# Patient Record
Sex: FEMALE | Race: WHITE | Employment: OTHER | URBAN - METROPOLITAN AREA
[De-identification: names, ages, dates, MRNs, and addresses within clinical notes are randomized per-mention and may not be internally consistent; named-entity substitution may affect disease eponyms.]

---

## 2017-05-17 RX ORDER — HYDROCHLOROTHIAZIDE 12.5 MG/1
CAPSULE, GELATIN COATED ORAL
Qty: 90 CAPSULE | Refills: 0 | Status: SHIPPED | OUTPATIENT
Start: 2017-05-17 | End: 2017-08-05

## 2017-05-17 RX ORDER — PHENOBARBITAL 60 MG/1
TABLET ORAL
COMMUNITY
Start: 2007-12-10 | End: 2017-10-17 | Stop reason: DRUGHIGH

## 2017-05-17 RX ORDER — EPINEPHRINE 0.3 MG/.3ML
0.3 INJECTION SUBCUTANEOUS AS NEEDED
COMMUNITY
Start: 2014-09-13

## 2017-05-17 RX ORDER — HYDROCHLOROTHIAZIDE 12.5 MG/1
1 CAPSULE, GELATIN COATED ORAL DAILY
COMMUNITY
Start: 2012-06-07 | End: 2017-10-17

## 2017-08-07 RX ORDER — HYDROCHLOROTHIAZIDE 12.5 MG/1
CAPSULE, GELATIN COATED ORAL
Qty: 90 CAPSULE | Refills: 0 | Status: SHIPPED | OUTPATIENT
Start: 2017-08-07 | End: 2017-10-17

## 2017-10-17 ENCOUNTER — OFFICE VISIT (OUTPATIENT)
Dept: FAMILY MEDICINE CLINIC | Facility: CLINIC | Age: 62
End: 2017-10-17

## 2017-10-17 ENCOUNTER — LAB ENCOUNTER (OUTPATIENT)
Dept: LAB | Age: 62
End: 2017-10-17
Attending: FAMILY MEDICINE

## 2017-10-17 VITALS
SYSTOLIC BLOOD PRESSURE: 126 MMHG | DIASTOLIC BLOOD PRESSURE: 70 MMHG | HEART RATE: 60 BPM | HEIGHT: 65.5 IN | BODY MASS INDEX: 27.84 KG/M2 | WEIGHT: 169.13 LBS | TEMPERATURE: 98 F | RESPIRATION RATE: 16 BRPM

## 2017-10-17 DIAGNOSIS — R56.9 SEIZURE (HCC): ICD-10-CM

## 2017-10-17 DIAGNOSIS — G40.909 SEIZURE DISORDER (HCC): ICD-10-CM

## 2017-10-17 DIAGNOSIS — Z00.00 ANNUAL PHYSICAL EXAM: Primary | ICD-10-CM

## 2017-10-17 PROCEDURE — 85025 COMPLETE CBC W/AUTO DIFF WBC: CPT | Performed by: FAMILY MEDICINE

## 2017-10-17 PROCEDURE — 36415 COLL VENOUS BLD VENIPUNCTURE: CPT | Performed by: FAMILY MEDICINE

## 2017-10-17 PROCEDURE — 99396 PREV VISIT EST AGE 40-64: CPT | Performed by: FAMILY MEDICINE

## 2017-10-17 PROCEDURE — 80053 COMPREHEN METABOLIC PANEL: CPT | Performed by: FAMILY MEDICINE

## 2017-10-17 PROCEDURE — 80184 ASSAY OF PHENOBARBITAL: CPT | Performed by: FAMILY MEDICINE

## 2017-10-17 RX ORDER — HYDROCHLOROTHIAZIDE 12.5 MG/1
12.5 CAPSULE, GELATIN COATED ORAL DAILY
Qty: 90 CAPSULE | Refills: 3 | Status: SHIPPED | OUTPATIENT
Start: 2017-10-17 | End: 2018-11-09

## 2017-10-17 RX ORDER — PHENOBARBITAL 64.8 MG/1
2 TABLET ORAL 2 TIMES DAILY
COMMUNITY
Start: 2017-08-31 | End: 2020-06-30 | Stop reason: DRUGHIGH

## 2017-10-17 NOTE — PROGRESS NOTES
CC: Annual Physical Exam    HPI:   Silvia Kwon is a 58year old female who presents for a complete physical exam.  Patient generally doing well  . No seizurese since 1979. Pt seeing neurology soon    Pt generally feeling well, no complaints.       Wt R rest.  RESPIRATORY:  Denies shortness of breath, wheezing, cough or sputum. GASTROINTESTINAL:  Denies abdominal pain, nausea, vomiting, constipation, diarrhea, or blood in stool.   MUSCULOSKELETAL:  Denies weakness, muscle aches, back pain, joint pain, swe tenderness. ABDOMEN:  Soft, nondistended, nontender, bowel sounds normal in all 4 quadrants, no masses, no hepatosplenomegaly. BACK: No tenderness, no spasm, SLR test negative, FROM.   : Deferred   EXTREMITIES:  No edema, no cyanosis, no clubbing, FROM,

## 2017-10-18 ENCOUNTER — TELEPHONE (OUTPATIENT)
Dept: FAMILY MEDICINE CLINIC | Facility: CLINIC | Age: 62
End: 2017-10-18

## 2017-10-18 NOTE — TELEPHONE ENCOUNTER
----- Message from Hernan Olsen MD sent at 10/18/2017  8:21 AM CDT -----  Reviewed  Phenobarbital level normal-in range.   Chem panel, cbc - stable  Only elevated alk phos which is unchanged  Please copy labs to neurology

## 2017-11-13 ENCOUNTER — TELEPHONE (OUTPATIENT)
Dept: FAMILY MEDICINE CLINIC | Facility: CLINIC | Age: 62
End: 2017-11-13

## 2017-11-13 NOTE — TELEPHONE ENCOUNTER
Patient calling wanting to know when next colonoscopy was due, notified  February 2018 expressed understanding and thanks

## 2018-09-18 ENCOUNTER — OFFICE VISIT (OUTPATIENT)
Dept: FAMILY MEDICINE CLINIC | Facility: CLINIC | Age: 63
End: 2018-09-18
Payer: COMMERCIAL

## 2018-09-18 VITALS
OXYGEN SATURATION: 98 % | RESPIRATION RATE: 16 BRPM | DIASTOLIC BLOOD PRESSURE: 64 MMHG | SYSTOLIC BLOOD PRESSURE: 122 MMHG | WEIGHT: 152.19 LBS | BODY MASS INDEX: 24.46 KG/M2 | TEMPERATURE: 98 F | HEIGHT: 66.25 IN | HEART RATE: 68 BPM

## 2018-09-18 DIAGNOSIS — L24.89 IRRITANT CONTACT DERMATITIS DUE TO OTHER AGENTS: Primary | ICD-10-CM

## 2018-09-18 PROCEDURE — 99214 OFFICE O/P EST MOD 30 MIN: CPT | Performed by: FAMILY MEDICINE

## 2018-09-18 RX ORDER — PREDNISONE 20 MG/1
20 TABLET ORAL 2 TIMES DAILY
Qty: 10 TABLET | Refills: 0 | Status: SHIPPED | OUTPATIENT
Start: 2018-09-18 | End: 2018-09-23

## 2018-09-18 NOTE — PROGRESS NOTES
[address]  PROGRESS NOTE  Chief Complaint:   Patient presents with:  Rash: Started on neck and is spreading multiple different places.  Patient stated it is very itchy  Other: Patient had teeth cleaned and was told she had a few sores in Absolute 0.43 0.10 - 0.60 x10(3) uL    Eosinophil Absolute 0.22 0.00 - 0.30 x10(3) uL    Basophil Absolute 0.03 0.00 - 0.10 x10(3) uL    Immature Granulocyte Absolute 0.02 0.00 - 1.00 x10(3) uL    Neutrophil % 61.2 %    Lymphocyte % 26.8 %    Monocyte % 7. 0.3 MG/0.3ML Injection Solution Auto-injector  Disp:  Rfl:       Counseling given: Not Answered       REVIEW OF SYSTEMS:   CONSTITUTIONAL:  Denies unusual weight gain/loss, fever, chills, or fatigue.   EENT:  Eyes:  Denies eye pain, visual loss, blurred vis on both thighs and left thumb region. Is a small area of erythema on her right forearm. HEART:  Regular rate and rhythm, no murmurs, rubs or gallops. LUNGS: Clear to auscultation bilterally, no rales/rhonchi/wheezing.   ABDOMEN:  Soft, nondistended, nont

## 2018-09-18 NOTE — PATIENT INSTRUCTIONS
Contact dermatiitis    rec prednisone-- 5 days burst    Start zyrtec or claritin- daily for a month    Hydrate well.     Monitor and recheck if further concerns

## 2018-10-16 ENCOUNTER — OFFICE VISIT (OUTPATIENT)
Dept: FAMILY MEDICINE CLINIC | Facility: CLINIC | Age: 63
End: 2018-10-16
Payer: COMMERCIAL

## 2018-10-16 VITALS
HEART RATE: 78 BPM | TEMPERATURE: 97 F | OXYGEN SATURATION: 98 % | SYSTOLIC BLOOD PRESSURE: 144 MMHG | BODY MASS INDEX: 25.23 KG/M2 | RESPIRATION RATE: 16 BRPM | DIASTOLIC BLOOD PRESSURE: 82 MMHG | WEIGHT: 157 LBS | HEIGHT: 66 IN

## 2018-10-16 DIAGNOSIS — Z00.00 ANNUAL PHYSICAL EXAM: Primary | ICD-10-CM

## 2018-10-16 DIAGNOSIS — G40.909 SEIZURE DISORDER (HCC): ICD-10-CM

## 2018-10-16 DIAGNOSIS — I10 BENIGN ESSENTIAL HYPERTENSION: ICD-10-CM

## 2018-10-16 DIAGNOSIS — M15.9 PRIMARY OSTEOARTHRITIS INVOLVING MULTIPLE JOINTS: ICD-10-CM

## 2018-10-16 PROCEDURE — 88175 CYTOPATH C/V AUTO FLUID REDO: CPT | Performed by: FAMILY MEDICINE

## 2018-10-16 PROCEDURE — 99396 PREV VISIT EST AGE 40-64: CPT | Performed by: FAMILY MEDICINE

## 2018-10-16 PROCEDURE — 87624 HPV HI-RISK TYP POOLED RSLT: CPT | Performed by: FAMILY MEDICINE

## 2018-10-16 NOTE — PROGRESS NOTES
CC: Annual Physical Exam    HPI:   Jose Juan Arguelles is a 61year old female who presents for a complete physical exam.    Due for pap   nild congestion. No headache no dizziness. Patient thinks that she has been taking Claritin-D or possibly Claritin.   Her b x10(3) uL    Immature Granulocyte Absolute 0.02 0.00 - 1.00 x10(3) uL    Neutrophil % 61.2 %    Lymphocyte % 26.8 %    Monocyte % 7.4 %    Eosinophil % 3.8 %    Basophil % 0.5 %    Immature Granulocyte % 0.3 %         Current Outpatient Medications:  aspir Eyes:  Denies eye pain, visual loss, blurred vision, double vision or yellow sclerae. Ears, Nose, Throat:  Denies hearing loss, sneezing, congestion, runny nose or sore throat.   INTEGUMENTARY:  Denies rashes, itching, skin lesion, or excessive skin dryness thyromegaly. SKIN: No rashes, no skin lesion, no bruising, good turgor. HEART:  Regular rate and rhythm, no murmurs, rubs or gallops. LUNGS: Clear to auscultation bilaterally, no rales/rhonchi/wheezing.   BREAST: No tenderness, no masses, no lesion, no n Colorectal Screening due on 02/01/2005  Colonoscopy due on 02/04/2018  Pap Smear,3 Years due on 08/28/2018  Influenza Vaccine(1) due on 09/01/2018      Pt' s weight is Body mass index is 25.34 kg/m². , recommended low fat diet and aerobic exercise 30 minute

## 2018-10-16 NOTE — PATIENT INSTRUCTIONS
Pt to see Sobeida Cross for colonoscopy- Due    rec fasting labs    rec avoid Claritin D-- ok for Claritin    Pt decline flu vaccine    Encourage core strengthening and Kegel exercisise    Pap today

## 2018-10-17 ENCOUNTER — TELEPHONE (OUTPATIENT)
Dept: FAMILY MEDICINE CLINIC | Facility: CLINIC | Age: 63
End: 2018-10-17

## 2018-10-17 NOTE — TELEPHONE ENCOUNTER
----- Message from Sandee Barajas MD sent at 10/17/2018  4:00 PM CDT -----  Negative Pap smear. Negative HPV screen. Tests are reassuring   patient to have follow-up Pap if concerns.

## 2018-11-09 RX ORDER — HYDROCHLOROTHIAZIDE 12.5 MG/1
CAPSULE, GELATIN COATED ORAL
Qty: 90 CAPSULE | Refills: 3 | Status: SHIPPED | OUTPATIENT
Start: 2018-11-09 | End: 2019-11-16

## 2018-11-09 NOTE — TELEPHONE ENCOUNTER
.Future appt:     Your appointments     Date & Time Appointment Department [facility])    Nov 10, 2018  8:45 AM CST Laboratory Visit with REF [facility] ([facility])        [facility]  [facility] [place]  [place]

## 2018-11-10 ENCOUNTER — LABORATORY ENCOUNTER (OUTPATIENT)
Dept: LAB | Age: 63
End: 2018-11-10
Attending: FAMILY MEDICINE
Payer: COMMERCIAL

## 2018-11-10 DIAGNOSIS — G40.909 SEIZURE DISORDER (HCC): ICD-10-CM

## 2018-11-10 DIAGNOSIS — I10 BENIGN ESSENTIAL HYPERTENSION: ICD-10-CM

## 2018-11-10 DIAGNOSIS — Z00.00 ANNUAL PHYSICAL EXAM: ICD-10-CM

## 2018-11-10 PROCEDURE — 80061 LIPID PANEL: CPT | Performed by: FAMILY MEDICINE

## 2018-11-10 PROCEDURE — 80050 GENERAL HEALTH PANEL: CPT | Performed by: FAMILY MEDICINE

## 2018-11-10 PROCEDURE — 80184 ASSAY OF PHENOBARBITAL: CPT | Performed by: FAMILY MEDICINE

## 2018-11-10 PROCEDURE — 81003 URINALYSIS AUTO W/O SCOPE: CPT | Performed by: FAMILY MEDICINE

## 2018-11-10 PROCEDURE — 36415 COLL VENOUS BLD VENIPUNCTURE: CPT | Performed by: FAMILY MEDICINE

## 2018-11-12 ENCOUNTER — TELEPHONE (OUTPATIENT)
Dept: FAMILY MEDICINE CLINIC | Facility: CLINIC | Age: 63
End: 2018-11-12

## 2018-11-12 DIAGNOSIS — E87.1 HYPONATREMIA: Primary | ICD-10-CM

## 2018-11-12 NOTE — TELEPHONE ENCOUNTER
----- Message from Kwame Bunch MD sent at 11/12/2018  7:43 AM CST -----  Lab results reviewed  Lipids, thyroid, cbc-normal  Phenobarb level in range  ua-normal  Chemistry- Sodium Chloride slightly low-- this can be caused by HCTZ- I alexandre christine

## 2018-12-15 ENCOUNTER — APPOINTMENT (OUTPATIENT)
Dept: LAB | Age: 63
End: 2018-12-15
Attending: FAMILY MEDICINE
Payer: COMMERCIAL

## 2018-12-15 DIAGNOSIS — E87.1 HYPONATREMIA: ICD-10-CM

## 2018-12-15 PROCEDURE — 80048 BASIC METABOLIC PNL TOTAL CA: CPT | Performed by: FAMILY MEDICINE

## 2018-12-15 PROCEDURE — 36415 COLL VENOUS BLD VENIPUNCTURE: CPT | Performed by: FAMILY MEDICINE

## 2018-12-17 ENCOUNTER — TELEPHONE (OUTPATIENT)
Dept: FAMILY MEDICINE CLINIC | Facility: CLINIC | Age: 63
End: 2018-12-17

## 2019-07-08 ENCOUNTER — TELEPHONE (OUTPATIENT)
Dept: FAMILY MEDICINE CLINIC | Facility: CLINIC | Age: 64
End: 2019-07-08

## 2019-07-08 NOTE — TELEPHONE ENCOUNTER
FYI:  Pt last px on file done 10/16/18. Pt asked if she was due for her Medicare [facility]- pt informed that we cannot schedule for Medicare px until she actually has Medicare coverage.     Pt currently is working on [employer], will be going on [place]

## 2019-07-08 NOTE — TELEPHONE ENCOUNTER
No current issues per pt  appt scheduled    Future Appointments   Date Time Provider Godfrey Davis   8/19/2019  8:00 AM Jim Juarez MD [facility]

## 2019-08-19 ENCOUNTER — OFFICE VISIT (OUTPATIENT)
Dept: FAMILY MEDICINE CLINIC | Facility: CLINIC | Age: 64
End: 2019-08-19
Payer: COMMERCIAL

## 2019-08-19 ENCOUNTER — LAB ENCOUNTER (OUTPATIENT)
Dept: LAB | Age: 64
End: 2019-08-19
Attending: FAMILY MEDICINE
Payer: COMMERCIAL

## 2019-08-19 VITALS
HEIGHT: 65.5 IN | HEART RATE: 82 BPM | TEMPERATURE: 98 F | SYSTOLIC BLOOD PRESSURE: 128 MMHG | WEIGHT: 159.19 LBS | OXYGEN SATURATION: 98 % | DIASTOLIC BLOOD PRESSURE: 68 MMHG | BODY MASS INDEX: 26.2 KG/M2 | RESPIRATION RATE: 14 BRPM

## 2019-08-19 DIAGNOSIS — I10 BENIGN ESSENTIAL HYPERTENSION: ICD-10-CM

## 2019-08-19 DIAGNOSIS — Z00.00 ANNUAL PHYSICAL EXAM: Primary | ICD-10-CM

## 2019-08-19 DIAGNOSIS — G40.909 SEIZURE DISORDER (HCC): ICD-10-CM

## 2019-08-19 DIAGNOSIS — M15.9 PRIMARY OSTEOARTHRITIS INVOLVING MULTIPLE JOINTS: ICD-10-CM

## 2019-08-19 LAB
ALBUMIN SERPL-MCNC: 3.9 G/DL (ref 3.4–5)
ALBUMIN/GLOB SERPL: 1.1 {RATIO} (ref 1–2)
ALP LIVER SERPL-CCNC: 139 U/L (ref 50–130)
ALT SERPL-CCNC: 47 U/L (ref 13–56)
ANION GAP SERPL CALC-SCNC: 7 MMOL/L (ref 0–18)
AST SERPL-CCNC: 22 U/L (ref 15–37)
BASOPHILS # BLD AUTO: 0.03 X10(3) UL (ref 0–0.2)
BASOPHILS NFR BLD AUTO: 0.5 %
BILIRUB SERPL-MCNC: 0.4 MG/DL (ref 0.1–2)
BILIRUB UR QL STRIP.AUTO: NEGATIVE
BUN BLD-MCNC: 11 MG/DL (ref 7–18)
BUN/CREAT SERPL: 16.9 (ref 10–20)
CALCIUM BLD-MCNC: 8.9 MG/DL (ref 8.5–10.1)
CHLORIDE SERPL-SCNC: 101 MMOL/L (ref 98–112)
CHOLEST SMN-MCNC: 181 MG/DL (ref ?–200)
CLARITY UR REFRACT.AUTO: CLEAR
CO2 SERPL-SCNC: 30 MMOL/L (ref 21–32)
COLOR UR AUTO: YELLOW
CREAT BLD-MCNC: 0.65 MG/DL (ref 0.55–1.02)
DEPRECATED RDW RBC AUTO: 43.9 FL (ref 35.1–46.3)
EOSINOPHIL # BLD AUTO: 0.28 X10(3) UL (ref 0–0.7)
EOSINOPHIL NFR BLD AUTO: 4.3 %
ERYTHROCYTE [DISTWIDTH] IN BLOOD BY AUTOMATED COUNT: 13.2 % (ref 11–15)
GLOBULIN PLAS-MCNC: 3.6 G/DL (ref 2.8–4.4)
GLUCOSE BLD-MCNC: 94 MG/DL (ref 70–99)
GLUCOSE UR STRIP.AUTO-MCNC: NEGATIVE MG/DL
HCT VFR BLD AUTO: 43.4 % (ref 35–48)
HDLC SERPL-MCNC: 71 MG/DL (ref 40–59)
HGB BLD-MCNC: 14.7 G/DL (ref 12–16)
IMM GRANULOCYTES # BLD AUTO: 0.02 X10(3) UL (ref 0–1)
IMM GRANULOCYTES NFR BLD: 0.3 %
KETONES UR STRIP.AUTO-MCNC: NEGATIVE MG/DL
LDLC SERPL CALC-MCNC: 83 MG/DL (ref ?–100)
LEUKOCYTE ESTERASE UR QL STRIP.AUTO: NEGATIVE
LYMPHOCYTES # BLD AUTO: 1.28 X10(3) UL (ref 1–4)
LYMPHOCYTES NFR BLD AUTO: 19.6 %
M PROTEIN MFR SERPL ELPH: 7.5 G/DL (ref 6.4–8.2)
MCH RBC QN AUTO: 30.4 PG (ref 26–34)
MCHC RBC AUTO-ENTMCNC: 33.9 G/DL (ref 31–37)
MCV RBC AUTO: 89.7 FL (ref 80–100)
MONOCYTES # BLD AUTO: 0.47 X10(3) UL (ref 0.1–1)
MONOCYTES NFR BLD AUTO: 7.2 %
NEUTROPHILS # BLD AUTO: 4.45 X10 (3) UL (ref 1.5–7.7)
NEUTROPHILS # BLD AUTO: 4.45 X10(3) UL (ref 1.5–7.7)
NEUTROPHILS NFR BLD AUTO: 68.1 %
NITRITE UR QL STRIP.AUTO: NEGATIVE
NONHDLC SERPL-MCNC: 110 MG/DL (ref ?–130)
OSMOLALITY SERPL CALC.SUM OF ELEC: 285 MOSM/KG (ref 275–295)
PH UR STRIP.AUTO: 8 [PH] (ref 4.5–8)
PHENOBARB SERPL-MCNC: 28.3 UG/ML (ref 15–40)
PLATELET # BLD AUTO: 239 10(3)UL (ref 150–450)
POTASSIUM SERPL-SCNC: 3.8 MMOL/L (ref 3.5–5.1)
PROT UR STRIP.AUTO-MCNC: NEGATIVE MG/DL
RBC # BLD AUTO: 4.84 X10(6)UL (ref 3.8–5.3)
RBC UR QL AUTO: NEGATIVE
SODIUM SERPL-SCNC: 138 MMOL/L (ref 136–145)
SP GR UR STRIP.AUTO: 1.01 (ref 1–1.03)
TRIGL SERPL-MCNC: 134 MG/DL (ref 30–149)
TSI SER-ACNC: 2.13 MIU/ML (ref 0.36–3.74)
UROBILINOGEN UR STRIP.AUTO-MCNC: <2 MG/DL
VLDLC SERPL CALC-MCNC: 27 MG/DL (ref 0–30)
WBC # BLD AUTO: 6.5 X10(3) UL (ref 4–11)

## 2019-08-19 PROCEDURE — 99396 PREV VISIT EST AGE 40-64: CPT | Performed by: FAMILY MEDICINE

## 2019-08-19 PROCEDURE — 80061 LIPID PANEL: CPT | Performed by: FAMILY MEDICINE

## 2019-08-19 PROCEDURE — 36415 COLL VENOUS BLD VENIPUNCTURE: CPT | Performed by: FAMILY MEDICINE

## 2019-08-19 PROCEDURE — 80184 ASSAY OF PHENOBARBITAL: CPT | Performed by: FAMILY MEDICINE

## 2019-08-19 PROCEDURE — 80050 GENERAL HEALTH PANEL: CPT | Performed by: FAMILY MEDICINE

## 2019-08-19 PROCEDURE — 81003 URINALYSIS AUTO W/O SCOPE: CPT | Performed by: FAMILY MEDICINE

## 2019-08-19 NOTE — PATIENT INSTRUCTIONS
Generally doing well    Labs today    Continue same medications    Continue active lifestyle, encourage exercise 3-5 x a week    F.u 2020-\" welcome to [facility]\" in the spring

## 2019-08-19 NOTE — PROGRESS NOTES
[facility] [place]  PROGRESS NOTE  Chief Complaint:   Patient presents with:  Physical      HPI:   This is a 59year old female coming in for general medical f.u    Some increase stress- notes less exercose amd stress caring for [facility]  [facility] Outpatient Medications:  HYDROCHLOROTHIAZIDE 12.5 MG Oral Cap TAKE 1 CAPSULE DAILY Disp: 90 capsule Rfl: 3   aspirin 81 MG Oral Tab Take 81 mg by mouth daily. Disp:  Rfl:    PHENobarbital 64.8 MG Oral Tab Take 2 tablets by mouth 2 (two) times daily.  Disp: 65. 5\". Weight as of this encounter: 159 lb 3.2 oz. Vital signs reviewed. Appears stated age, well groomed. Physical Exam:  GEN:  Patient is alert, awake and oriented, well developed, well nourished, no apparent distress.   HEENT:  Head:  Normocephalic neoplasm of gastrointestinal tract     Benign essential hypertension     Osteoarthrosis     Seizure disorder [facility])      Patient Instructions   Generally doing well    Labs today    Continue same medications    Continue active lifestyle, encourage exercise

## 2019-08-20 ENCOUNTER — TELEPHONE (OUTPATIENT)
Dept: FAMILY MEDICINE CLINIC | Facility: CLINIC | Age: 64
End: 2019-08-20

## 2019-08-20 NOTE — TELEPHONE ENCOUNTER
----- Message from Arlen Sahu MD sent at 8/20/2019  7:58 AM CDT -----  Laboratory results reviewed. Urinalysis normal.Thyroid function normal  Phenobarbital level is in desired range. Lipid profile normal and reassuring.   Chemistry panel and CBC

## 2019-09-16 ENCOUNTER — TELEPHONE (OUTPATIENT)
Dept: FAMILY MEDICINE CLINIC | Facility: CLINIC | Age: 64
End: 2019-09-16

## 2019-09-16 NOTE — TELEPHONE ENCOUNTER
Pt will be helping to watch her very your grandchildren. Pt informed that her last Tdap on file was given 10/8/17. Pt also informed she should get her flu injection.   Pt asked if there is any other vaccine she should have to protect her your grandchildre

## 2019-09-16 NOTE — TELEPHONE ENCOUNTER
Patient states she will be taking care of her 3year old and 1 month old grandkids, she would like to know if she is up to date on her vaccines and if she had the whooping cough vaccine.  Please call back

## 2019-11-16 RX ORDER — HYDROCHLOROTHIAZIDE 12.5 MG/1
CAPSULE, GELATIN COATED ORAL
Qty: 90 CAPSULE | Refills: 4 | Status: SHIPPED | OUTPATIENT
Start: 2019-11-16 | End: 2020-01-28

## 2019-11-16 NOTE — TELEPHONE ENCOUNTER
Future appt:     Your appointments     Date & Time Appointment Department [facility])    Mar 20, 2020  8:00 AM CDT Medicare Annual Well Visit with Luke Orellana MD [facility], [facility]            Ed

## 2020-01-28 ENCOUNTER — TELEPHONE (OUTPATIENT)
Dept: FAMILY MEDICINE CLINIC | Facility: CLINIC | Age: 65
End: 2020-01-28

## 2020-01-28 RX ORDER — HYDROCHLOROTHIAZIDE 12.5 MG/1
12.5 CAPSULE, GELATIN COATED ORAL
Qty: 90 CAPSULE | Refills: 1 | Status: SHIPPED | OUTPATIENT
Start: 2020-01-28 | End: 2020-08-19

## 2020-01-28 NOTE — TELEPHONE ENCOUNTER
Pt states that she is now on Medicare and will be using a new mail order for her scripts. She states that the new mail order company is [employer], ph# [phone], fax# [phone]. She is requesting a refill of HCTZ 12.5mg to be sent there.

## 2020-03-16 ENCOUNTER — TELEPHONE (OUTPATIENT)
Dept: FAMILY MEDICINE CLINIC | Facility: CLINIC | Age: 65
End: 2020-03-16

## 2020-03-16 DIAGNOSIS — I10 BENIGN ESSENTIAL HYPERTENSION: Primary | ICD-10-CM

## 2020-03-16 DIAGNOSIS — Z00.00 ANNUAL PHYSICAL EXAM: ICD-10-CM

## 2020-03-16 DIAGNOSIS — Z11.59 ENCOUNTER FOR HEPATITIS C SCREENING TEST FOR LOW RISK PATIENT: ICD-10-CM

## 2020-03-16 DIAGNOSIS — G40.909 SEIZURE DISORDER (HCC): ICD-10-CM

## 2020-03-16 NOTE — TELEPHONE ENCOUNTER
Patient has px appointment this Friday, wants to know if she should r/s due to COVID-19. Would like a call back.

## 2020-03-16 NOTE — TELEPHONE ENCOUNTER
Pt states she has an appt on Friday, wanted to reschedule her welcome to medicare appt due to community spread illness. Pt states she was doing fine at this time.       Future Appointments   Date Time Provider Godfrey Davis   5/7/2020  8:45 AM [facility]

## 2020-06-25 ENCOUNTER — LABORATORY ENCOUNTER (OUTPATIENT)
Dept: LAB | Age: 65
End: 2020-06-25
Attending: FAMILY MEDICINE
Payer: MEDICARE

## 2020-06-25 DIAGNOSIS — G40.909 SEIZURE DISORDER (HCC): ICD-10-CM

## 2020-06-25 DIAGNOSIS — I10 BENIGN ESSENTIAL HYPERTENSION: ICD-10-CM

## 2020-06-25 DIAGNOSIS — Z00.00 ANNUAL PHYSICAL EXAM: ICD-10-CM

## 2020-06-25 DIAGNOSIS — Z11.59 ENCOUNTER FOR HEPATITIS C SCREENING TEST FOR LOW RISK PATIENT: ICD-10-CM

## 2020-06-25 PROCEDURE — 80061 LIPID PANEL: CPT

## 2020-06-25 PROCEDURE — 80053 COMPREHEN METABOLIC PANEL: CPT

## 2020-06-25 PROCEDURE — 84443 ASSAY THYROID STIM HORMONE: CPT

## 2020-06-25 PROCEDURE — 80184 ASSAY OF PHENOBARBITAL: CPT

## 2020-06-25 PROCEDURE — 85025 COMPLETE CBC W/AUTO DIFF WBC: CPT

## 2020-06-25 PROCEDURE — 86803 HEPATITIS C AB TEST: CPT

## 2020-06-25 PROCEDURE — 81003 URINALYSIS AUTO W/O SCOPE: CPT

## 2020-06-25 PROCEDURE — 36415 COLL VENOUS BLD VENIPUNCTURE: CPT

## 2020-06-30 ENCOUNTER — OFFICE VISIT (OUTPATIENT)
Dept: FAMILY MEDICINE CLINIC | Facility: CLINIC | Age: 65
End: 2020-06-30
Payer: MEDICARE

## 2020-06-30 VITALS
BODY MASS INDEX: 27.02 KG/M2 | SYSTOLIC BLOOD PRESSURE: 138 MMHG | HEIGHT: 65 IN | WEIGHT: 162.19 LBS | RESPIRATION RATE: 16 BRPM | DIASTOLIC BLOOD PRESSURE: 84 MMHG | HEART RATE: 80 BPM | TEMPERATURE: 97 F

## 2020-06-30 DIAGNOSIS — I10 BENIGN ESSENTIAL HYPERTENSION: ICD-10-CM

## 2020-06-30 DIAGNOSIS — G40.909 SEIZURE DISORDER (HCC): ICD-10-CM

## 2020-06-30 DIAGNOSIS — Z23 NEED FOR VACCINATION: ICD-10-CM

## 2020-06-30 DIAGNOSIS — Z00.00 ENCOUNTER FOR ANNUAL HEALTH EXAMINATION: Primary | ICD-10-CM

## 2020-06-30 DIAGNOSIS — M15.9 PRIMARY OSTEOARTHRITIS INVOLVING MULTIPLE JOINTS: ICD-10-CM

## 2020-06-30 DIAGNOSIS — Z23 NEED FOR PNEUMOCOCCAL VACCINATION: ICD-10-CM

## 2020-06-30 PROCEDURE — G0403 EKG FOR INITIAL PREVENT EXAM: HCPCS | Performed by: FAMILY MEDICINE

## 2020-06-30 PROCEDURE — G0009 ADMIN PNEUMOCOCCAL VACCINE: HCPCS | Performed by: FAMILY MEDICINE

## 2020-06-30 PROCEDURE — G0402 INITIAL PREVENTIVE EXAM: HCPCS | Performed by: FAMILY MEDICINE

## 2020-06-30 PROCEDURE — 90670 PCV13 VACCINE IM: CPT | Performed by: FAMILY MEDICINE

## 2020-06-30 RX ORDER — PHENOBARBITAL 60 MG/1
120 TABLET ORAL 2 TIMES DAILY
COMMUNITY
Start: 2020-05-19

## 2020-06-30 NOTE — PATIENT INSTRUCTIONS
rec EKG today    Recommend mammogram    rec prevnar- pneumonia vaccine    Continue present medications    Encourage healthy diet and exercise    Recheck yearly and as needed          Loretta Wolff Venemarian's SCREENING SCHEDULE   Tests on this list are recommended b cigarettes in their lifetime   • Anyone with a family history    Colorectal Cancer Screening  Covered up to Age 76     Colonoscopy Screen   Covered every 10 years- more often if abnormal Colonoscopy due on 12/03/2028 [facility] if applicable performed in visit on 06/30/20   • PNEUMOCOCCAL VACC, 13 FLOYD IM    Please get once after your 65th birthday    Pneumococcal 23 (Pneumovax)  Covered Once after 65 No orders found for this or any previous visit.  Please get once after your 65th birthday    He

## 2020-06-30 NOTE — PROGRESS NOTES
CC: Annual Physical Exam    HPI:   Hussein Jimenez is a 72year old female who presents for a complete physical exam.  [place] to medicare checkup    Laboratory results reviewed.  Urinalysis negative.  Hepatitis C screen negative.  Chemistry panel shows sodi mg/dL    HDL Cholesterol 71 (H) 40 - 59 mg/dL    Triglycerides 126 30 - 149 mg/dL    LDL Cholesterol 103 (H) <100 mg/dL    VLDL 25 0 - 30 mg/dL    Non HDL Chol 128 <130 mg/dL    FASTING Yes    TSH W REFLEX TO FREE T4   Result Value Ref Range    TSH 1.910 0 capsule (12.5 mg total) by mouth once daily. 90 capsule 1   • aspirin 81 MG Oral Tab Take 81 mg by mouth daily. • EPINEPHrine (EPIPEN 2-FERN) 0.3 MG/0.3ML Injection Solution Auto-injector 0.3 mL as needed.             Bee                         Comment: help    Managing money/bills: Able without help    Taking medications as prescribed: Able without help    Are you able to afford your medications?: Yes    Hearing Problems?: No     Functional Status     Hearing Problems?: No    Vision Problems? : No    Dif sputum. GASTROINTESTINAL:  Denies abdominal pain, nausea, vomiting, constipation, diarrhea, or blood in stool. MUSCULOSKELETAL:  Denies weakness, muscle aches, back pain, joint pain, swelling or stiffness.   NEUROLOGICAL:  Denies headache, seizures, dizzi nondistended, nontender,no masses, no hepatosplenomegaly  BACK: No tenderness, no spasm, SLR test negative, FROM. : Deferred   EXTREMITIES:  No edema, no cyanosis, no clubbing, FROM, 2+ dorsalis pedis pulses bilaterally.   NEURO:  No deficit, normal gait not be covered, or covered at this frequency, by your insurer. Please check with your insurance carrier before scheduling to verify coverage.    PREVENTATIVE SERVICES  INDICATIONS AND SCHEDULE Internal Lab or Procedure External Lab or Procedure   Diabetes S Screen  Covered every 5 years No results found for this or any previous visit. No flowsheet data found. Fecal Occult Blood   Covered Annually No results found for: FOB, OCCULTSTOOL No flowsheet data found.      Barium Enema-   uncomfortable but covered Risk       No orders found for this or any previous visit.  Medium/high risk factors:   End-stage renal disease   Hemophiliacs who received Factor VIII or IX concentrates   Clients of institutions for the mentally retarded   Persons who live in the same [place] and as needed. This note was created utilizing Dragon speech recognition software.  Please excuse any grammatical errors. Call my office if you have any questions regarding this note.

## 2020-07-03 ENCOUNTER — HOSPITAL ENCOUNTER (OUTPATIENT)
Dept: MAMMOGRAPHY | Age: 65
Discharge: HOME | End: 2020-07-03
Attending: FAMILY MEDICINE
Payer: MEDICARE

## 2020-07-03 DIAGNOSIS — Z12.31 BREAST CANCER SCREENING BY MAMMOGRAM: ICD-10-CM

## 2020-07-03 PROCEDURE — 77063 BREAST TOMOSYNTHESIS BI: CPT | Performed by: FAMILY MEDICINE

## 2020-07-03 PROCEDURE — 77067 SCR MAMMO BI INCL CAD: CPT | Performed by: FAMILY MEDICINE

## 2020-07-06 ENCOUNTER — TELEPHONE (OUTPATIENT)
Dept: FAMILY MEDICINE CLINIC | Facility: CLINIC | Age: 65
End: 2020-07-06

## 2020-07-06 NOTE — TELEPHONE ENCOUNTER
Spoke with Ben Fontana at [address] Ben Fontana states they have requested additional imaging from [facility] for comparison. Ben Fontana states they have also left a message for pt and are waiting to hear back.     Pt states she was contacted and was told

## 2020-07-11 NOTE — TELEPHONE ENCOUNTER
Spoke with pt. Pt states she hasn't heard anything yet. Pt states she will call Elieser Monge on Monday re: need to follow up.

## 2020-07-13 ENCOUNTER — TELEPHONE (OUTPATIENT)
Dept: FAMILY MEDICINE CLINIC | Facility: CLINIC | Age: 65
End: 2020-07-13

## 2020-07-13 DIAGNOSIS — R92.8 ABNORMAL MAMMOGRAM OF BOTH BREASTS: Primary | ICD-10-CM

## 2020-07-13 NOTE — TELEPHONE ENCOUNTER
Left detailed message for pt. Follow up mammogram order with reported dated 7/3/20 faxed to [facility].

## 2020-07-13 NOTE — TELEPHONE ENCOUNTER
Request noted. Follow up mammogram ordered on 7/6- to be done with [facility]. Please revise mammogram order to be done with external facility.

## 2020-07-13 NOTE — TELEPHONE ENCOUNTER
Patient states she wants to have her mammogram at [facility] and not at [facility]

## 2020-08-12 ENCOUNTER — TELEPHONE (OUTPATIENT)
Dept: FAMILY MEDICINE CLINIC | Facility: CLINIC | Age: 65
End: 2020-08-12

## 2020-08-12 DIAGNOSIS — R92.8 ABNORMAL MAMMOGRAM OF LEFT BREAST: Primary | ICD-10-CM

## 2020-08-12 NOTE — TELEPHONE ENCOUNTER
Patient informed of the below results and recommendations. Orders faxed to [facility] patient scheduling.

## 2020-08-12 NOTE — TELEPHONE ENCOUNTER
Please notify patient that her diagnostic mammo/us shows that it is likely benign- recommend repeat mammogram in 6 months to assess stability.   Order entered–[facility] fax to [facility].

## 2020-08-19 RX ORDER — HYDROCHLOROTHIAZIDE 12.5 MG/1
CAPSULE, GELATIN COATED ORAL
Qty: 90 CAPSULE | Refills: 3 | Status: SHIPPED | OUTPATIENT
Start: 2020-08-19 | End: 2021-02-24

## 2020-08-19 NOTE — TELEPHONE ENCOUNTER
Future appt:    Last Appointment with provider:   6/30/2020-  Pt was advised to follow up in one year and prn  Last appointment at [facility]:  6/30/2020    HCTZ refilled on 1/28/20 for #90 with one refill    Cholesterol, Total (mg/dL)   Date Value   06/25/2020 199     HDL Cholesterol (mg/dL)   Date Value   06/25/2020 71 (H)     LDL Cholesterol (mg/dL)   Date Value   06/25/2020 103 (H)     Triglycerides (mg/dL)   Date Value   06/25/2020 126     No results found for: EAG, A1C  Lab Results   Component Value Date    TSH 1.910 06/25/2020       No follow-ups on file.

## 2020-08-27 ENCOUNTER — TELEPHONE (OUTPATIENT)
Dept: FAMILY MEDICINE CLINIC | Facility: CLINIC | Age: 65
End: 2020-08-27

## 2020-08-27 NOTE — TELEPHONE ENCOUNTER
Patient saw her neurologist today and is requesting her phenobarbital level result faxed to him. It is Dr. Oscar Elliottelor in [facility]. Also asking about Shingrix vaccine.  Questions answered and also advised that Shingrix is not covered by medicare in the of

## 2021-02-24 ENCOUNTER — TELEPHONE (OUTPATIENT)
Dept: FAMILY MEDICINE CLINIC | Facility: CLINIC | Age: 66
End: 2021-02-24

## 2021-02-24 RX ORDER — HYDROCHLOROTHIAZIDE 12.5 MG/1
12.5 CAPSULE, GELATIN COATED ORAL DAILY
Qty: 90 CAPSULE | Refills: 1 | Status: SHIPPED | OUTPATIENT
Start: 2021-02-24 | End: 2021-08-02

## 2021-02-24 NOTE — TELEPHONE ENCOUNTER
Future appt:     Your appointments     Date & Time Appointment Department [facility])    Jul 02, 2021  8:00 AM CDT Medicare Annual Well Visit with Surinder Helton MD [facility], [facility])            Ed

## 2021-02-24 NOTE — TELEPHONE ENCOUNTER
Needs refill of hydrochlorothiazide sent to [facility] in [place]. Pt changed to [facility] from mail order.

## 2021-03-13 DIAGNOSIS — Z23 NEED FOR VACCINATION: ICD-10-CM

## 2021-07-02 ENCOUNTER — OFFICE VISIT (OUTPATIENT)
Dept: FAMILY MEDICINE CLINIC | Facility: CLINIC | Age: 66
End: 2021-07-02
Payer: MEDICARE

## 2021-07-02 ENCOUNTER — LAB ENCOUNTER (OUTPATIENT)
Dept: LAB | Age: 66
End: 2021-07-02
Attending: FAMILY MEDICINE
Payer: MEDICARE

## 2021-07-02 VITALS
BODY MASS INDEX: 27.29 KG/M2 | HEART RATE: 68 BPM | RESPIRATION RATE: 16 BRPM | DIASTOLIC BLOOD PRESSURE: 78 MMHG | TEMPERATURE: 97 F | SYSTOLIC BLOOD PRESSURE: 130 MMHG | WEIGHT: 165.81 LBS | HEIGHT: 65.25 IN

## 2021-07-02 DIAGNOSIS — E87.1 HYPONATREMIA: ICD-10-CM

## 2021-07-02 DIAGNOSIS — Z00.00 ENCOUNTER FOR ANNUAL HEALTH EXAMINATION: Primary | ICD-10-CM

## 2021-07-02 DIAGNOSIS — R73.09 ELEVATED GLUCOSE: ICD-10-CM

## 2021-07-02 DIAGNOSIS — M15.9 PRIMARY OSTEOARTHRITIS INVOLVING MULTIPLE JOINTS: ICD-10-CM

## 2021-07-02 DIAGNOSIS — Z78.0 POSTMENOPAUSAL: ICD-10-CM

## 2021-07-02 DIAGNOSIS — Z23 NEED FOR PNEUMOCOCCAL VACCINATION: ICD-10-CM

## 2021-07-02 DIAGNOSIS — Z00.00 ENCOUNTER FOR ANNUAL HEALTH EXAMINATION: ICD-10-CM

## 2021-07-02 DIAGNOSIS — I10 BENIGN ESSENTIAL HYPERTENSION: ICD-10-CM

## 2021-07-02 DIAGNOSIS — Z13.31 DEPRESSION SCREENING: ICD-10-CM

## 2021-07-02 DIAGNOSIS — R92.8 ABNORMAL MAMMOGRAM OF BOTH BREASTS: ICD-10-CM

## 2021-07-02 DIAGNOSIS — G40.909 SEIZURE DISORDER (HCC): ICD-10-CM

## 2021-07-02 LAB
ALBUMIN SERPL-MCNC: 3.9 G/DL (ref 3.4–5)
ALBUMIN/GLOB SERPL: 1.1 {RATIO} (ref 1–2)
ALP LIVER SERPL-CCNC: 134 U/L
ALT SERPL-CCNC: 33 U/L
ANION GAP SERPL CALC-SCNC: 9 MMOL/L (ref 0–18)
AST SERPL-CCNC: 21 U/L (ref 15–37)
BASOPHILS # BLD AUTO: 0.02 X10(3) UL (ref 0–0.2)
BASOPHILS NFR BLD AUTO: 0.4 %
BILIRUB SERPL-MCNC: 0.5 MG/DL (ref 0.1–2)
BILIRUB UR QL STRIP.AUTO: NEGATIVE
BUN BLD-MCNC: 13 MG/DL (ref 7–18)
BUN/CREAT SERPL: 22.4 (ref 10–20)
CALCIUM BLD-MCNC: 8.5 MG/DL (ref 8.5–10.1)
CHLORIDE SERPL-SCNC: 95 MMOL/L (ref 98–112)
CHOLEST SMN-MCNC: 178 MG/DL (ref ?–200)
CLARITY UR REFRACT.AUTO: CLEAR
CO2 SERPL-SCNC: 28 MMOL/L (ref 21–32)
CREAT BLD-MCNC: 0.58 MG/DL
DEPRECATED RDW RBC AUTO: 41.1 FL (ref 35.1–46.3)
EOSINOPHIL # BLD AUTO: 0.24 X10(3) UL (ref 0–0.7)
EOSINOPHIL NFR BLD AUTO: 4.6 %
ERYTHROCYTE [DISTWIDTH] IN BLOOD BY AUTOMATED COUNT: 12.7 % (ref 11–15)
GLOBULIN PLAS-MCNC: 3.5 G/DL (ref 2.8–4.4)
GLUCOSE BLD-MCNC: 105 MG/DL (ref 70–99)
GLUCOSE UR STRIP.AUTO-MCNC: NEGATIVE MG/DL
HCT VFR BLD AUTO: 41.1 %
HDLC SERPL-MCNC: 80 MG/DL (ref 40–59)
HGB BLD-MCNC: 13.9 G/DL
IMM GRANULOCYTES # BLD AUTO: 0.02 X10(3) UL (ref 0–1)
IMM GRANULOCYTES NFR BLD: 0.4 %
KETONES UR STRIP.AUTO-MCNC: NEGATIVE MG/DL
LDLC SERPL CALC-MCNC: 84 MG/DL (ref ?–100)
LEUKOCYTE ESTERASE UR QL STRIP.AUTO: NEGATIVE
LYMPHOCYTES # BLD AUTO: 1.05 X10(3) UL (ref 1–4)
LYMPHOCYTES NFR BLD AUTO: 20.1 %
M PROTEIN MFR SERPL ELPH: 7.4 G/DL (ref 6.4–8.2)
MCH RBC QN AUTO: 29.8 PG (ref 26–34)
MCHC RBC AUTO-ENTMCNC: 33.8 G/DL (ref 31–37)
MCV RBC AUTO: 88 FL
MONOCYTES # BLD AUTO: 0.49 X10(3) UL (ref 0.1–1)
MONOCYTES NFR BLD AUTO: 9.4 %
NEUTROPHILS # BLD AUTO: 3.41 X10 (3) UL (ref 1.5–7.7)
NEUTROPHILS # BLD AUTO: 3.41 X10(3) UL (ref 1.5–7.7)
NEUTROPHILS NFR BLD AUTO: 65.1 %
NITRITE UR QL STRIP.AUTO: NEGATIVE
NONHDLC SERPL-MCNC: 98 MG/DL (ref ?–130)
OSMOLALITY SERPL CALC.SUM OF ELEC: 274 MOSM/KG (ref 275–295)
PATIENT FASTING Y/N/NP: YES
PATIENT FASTING Y/N/NP: YES
PH UR STRIP.AUTO: 8 [PH] (ref 5–8)
PHENOBARB SERPL-MCNC: 28.2 UG/ML (ref 15–40)
PLATELET # BLD AUTO: 244 10(3)UL (ref 150–450)
POTASSIUM SERPL-SCNC: 4 MMOL/L (ref 3.5–5.1)
PROT UR STRIP.AUTO-MCNC: NEGATIVE MG/DL
RBC # BLD AUTO: 4.67 X10(6)UL
RBC UR QL AUTO: NEGATIVE
SODIUM SERPL-SCNC: 132 MMOL/L (ref 136–145)
SP GR UR STRIP.AUTO: 1 (ref 1–1.03)
TRIGL SERPL-MCNC: 77 MG/DL (ref 30–149)
TSI SER-ACNC: 1.05 MIU/ML (ref 0.36–3.74)
UROBILINOGEN UR STRIP.AUTO-MCNC: <2 MG/DL
VLDLC SERPL CALC-MCNC: 12 MG/DL (ref 0–30)
WBC # BLD AUTO: 5.2 X10(3) UL (ref 4–11)

## 2021-07-02 PROCEDURE — 90732 PPSV23 VACC 2 YRS+ SUBQ/IM: CPT | Performed by: FAMILY MEDICINE

## 2021-07-02 PROCEDURE — 80053 COMPREHEN METABOLIC PANEL: CPT | Performed by: FAMILY MEDICINE

## 2021-07-02 PROCEDURE — 84443 ASSAY THYROID STIM HORMONE: CPT | Performed by: FAMILY MEDICINE

## 2021-07-02 PROCEDURE — 80184 ASSAY OF PHENOBARBITAL: CPT | Performed by: FAMILY MEDICINE

## 2021-07-02 PROCEDURE — 36415 COLL VENOUS BLD VENIPUNCTURE: CPT | Performed by: FAMILY MEDICINE

## 2021-07-02 PROCEDURE — 81003 URINALYSIS AUTO W/O SCOPE: CPT

## 2021-07-02 PROCEDURE — 85025 COMPLETE CBC W/AUTO DIFF WBC: CPT | Performed by: FAMILY MEDICINE

## 2021-07-02 PROCEDURE — 80061 LIPID PANEL: CPT | Performed by: FAMILY MEDICINE

## 2021-07-02 PROCEDURE — G0438 PPPS, INITIAL VISIT: HCPCS | Performed by: FAMILY MEDICINE

## 2021-07-02 PROCEDURE — G0009 ADMIN PNEUMOCOCCAL VACCINE: HCPCS | Performed by: FAMILY MEDICINE

## 2021-07-02 NOTE — PROGRESS NOTES
CC: Annual Physical Exam    HPI:   Fariba Winston is a 77year old female who presents for a complete physical exam.  Patient generally feeling well    Pt sees neurology once a year.  No seizures , due for labs     General health care, cancer screening, he 450.0 10(3)uL    MCV 88.0 80.0 - 100.0 fL    MCH 29.8 26.0 - 34.0 pg    MCHC 33.8 31.0 - 37.0 g/dL    RDW 12.7 11.0 - 15.0 %    RDW-SD 41.1 35.1 - 46.3 fL    Neutrophil Absolute Prelim 3.41 1.50 - 7.70 x10 (3) uL    Neutrophil Absolute 3.41 1.50 - 7.70 x10 six months, have you lost more than 10 pounds without trying?: 2 - No    Has your appetite been poor?: No    Type of Diet: Balanced    How does the patient maintain a good energy level?: Other (chores, house work, walking)    How would you describe your da you have a living will?: Yes     Hearing Assessment (Required for AWV/SWV)      Hearing Screening    Screening Method: Finger Rub  Finger Rub Result: Pass         Visual Acuity                   Cognitive Assessment     What day of the week is this?: Corre (1.657 m)   Wt 165 lb 12.8 oz (75.2 kg)   BMI 27.38 kg/m²  Estimated body mass index is 27.38 kg/m² as calculated from the following:    Height as of this encounter: 5' 5.25\" (1.657 m). Weight as of this encounter: 165 lb 12.8 oz (75.2 kg).    Vital sig COMP METABOLIC PANEL (14)  - LIPID PANEL  - TSH W REFLEX TO FREE T4    2. Depression screening  negative  - DEPRESSION SCREEN ANNUAL    3. Benign essential hypertension  Stable, cpm  - PHENOBARBITAL; Future  - CBC WITH DIFFERENTIAL WITH PLATELET;  Future  - joints  Postmenopausal  Abnormal mammogram of both breasts  Need for pneumococcal vaccination    Patient Instructions     rec DEXA scan    Pneumovax vaccine today    rec mammogram in September    Recommend continue present medications    Fasting labs today Screening    Bone density screening    Covered every 2 years after age 72 if diagnosed with risk of osteoporosis or estrogen deficiency.     Covered yearly for long-term glucocorticoid medication use (Steroids) No results found for this or any previous visi Websites for Advanced Directives    [employer].no. org/publications/Documents/personal_dec. pdf  An information packet, including necessary form from the [employer] website. http://www. [place].state. [place]/public/books/advin.htm  A link

## 2021-07-02 NOTE — PATIENT INSTRUCTIONS
rec DEXA scan    Pneumovax vaccine today    rec mammogram in September    Recommend continue present medications    Fasting labs today      Lashae Mora's SCREENING SCHEDULE   Tests on this list are recommended by your physician but may not be covered, deficiency. Covered yearly for long-term glucocorticoid medication use (Steroids) No results found for this or any previous visit. DEXA Scan Never done   Pap and Pelvic    Pap   Covered every 2 years for women at normal risk;  Annually if at high ri necessary form from the [employer]. http://www. [place]. [place]/public/books/advin.htm  A link to the [employer].  This site has a lot of good information including definitions of the different types of Ad

## 2021-07-06 ENCOUNTER — TELEPHONE (OUTPATIENT)
Dept: FAMILY MEDICINE CLINIC | Facility: CLINIC | Age: 66
End: 2021-07-06

## 2021-07-06 NOTE — TELEPHONE ENCOUNTER
----- Message from Dusty Lutz MD sent at 7/2/2021  7:49 PM CDT -----  Laboratory results reviewed. Thyroid function normal. Chemistry profile shows sodium low at 132. Glucose borderline at 105.   Lipid profile normal.  CBC normal.  Phenobarbital l

## 2021-07-06 NOTE — TELEPHONE ENCOUNTER
Pt informed. Pt states she drinks 64 ounces of water per day and 32 ounces of coffee. Instructions reviewed with pt. Can pt drink -Zero sugar Gatorade for electrolytes? Pt also asked if she should take a salt tablet?

## 2021-07-06 NOTE — TELEPHONE ENCOUNTER
Patient encouraged to monitor and control blood pressure.   Patient can check with insurance to see if they cover ultrasound for evaluation of abdominal aortic aneurysm screening

## 2021-07-06 NOTE — TELEPHONE ENCOUNTER
Pt recommended to decrease coffee to 1/2 the amount to decrease diuretic effect. I do not think she needs salt tablets but rather adjust diet- liberalize salt some. And recheck in a month    No future appointments.

## 2021-07-06 NOTE — TELEPHONE ENCOUNTER
----- Message from Hernan Olsen MD sent at 7/2/2021  9:10 PM CDT -----  Normal phenobarbital level   Copy of result to neurology    Results forwarded to patient via [employer] system. Patient encouraged to call for any questions or concerns.

## 2021-07-06 NOTE — TELEPHONE ENCOUNTER
Pt informed. Pt had another question. Pt states she wanted to mention that her mother had an abdominal aneurysm that was repaired in her later years- detected in her early [de-identified]. Pt wondered if that was something she should have checked.   No acute conc

## 2021-07-06 NOTE — TELEPHONE ENCOUNTER
Pt informed. Pt verbalized understanding. Pt states she will call back to schedule lab follow up appt. Pt asked if she should be on another medication other than hydrochlorothiazide due to concern for low sodium.   Pt asked if there was another med dawson

## 2021-08-02 RX ORDER — HYDROCHLOROTHIAZIDE 12.5 MG/1
12.5 CAPSULE, GELATIN COATED ORAL DAILY
Qty: 90 CAPSULE | Refills: 3 | Status: SHIPPED | OUTPATIENT
Start: 2021-08-02 | End: 2022-08-12

## 2021-08-02 NOTE — TELEPHONE ENCOUNTER
Future appt:    Last Appointment with provider:   7/2/2021  Last appointment at [facility]:  7/2/2021  Last px: 7/2/2021    hydrochlorothiazide 12.5 MG Oral Cap #90 with 1 refill, pt to take 1 cap po daily, last refilled on 2/24/2021    Cholesterol, Total (mg/dL)   Date Value   07/02/2021 178     HDL Cholesterol (mg/dL)   Date Value   07/02/2021 80 (H)     LDL Cholesterol (mg/dL)   Date Value   07/02/2021 84     Triglycerides (mg/dL)   Date Value   07/02/2021 77     No results found for: EAG, A1C  Lab Results   Component Value Date    TSH 1.050 07/02/2021       No follow-ups on file.

## 2022-02-04 ENCOUNTER — TELEPHONE (OUTPATIENT)
Dept: FAMILY MEDICINE CLINIC | Facility: CLINIC | Age: 67
End: 2022-02-04

## 2022-02-04 NOTE — TELEPHONE ENCOUNTER
Informed pt that lab orders have been entered. Appointment made for lab draw.     Future Appointments   Date Time Provider Godfrey Davis   3/17/2022  8:45 AM REF [place] [facility]   3/22/2022  3:30 PM Simran Smith MD [facility] [place] [facility] [place]

## 2022-02-04 NOTE — TELEPHONE ENCOUNTER
scheduled her annual.  Needs lab order prior to her appt. Would like also uric acid & phenobarbitol included. Please let patient know when order is in.    Future Appointments   Date Time Provider Godfrey Davis   3/22/2022  3:30 PM MD SINDI Calloway

## 2022-02-04 NOTE — TELEPHONE ENCOUNTER
Please advise on pt requested orders to be done prior to annual physical. Pt is requesting normal annual labs plus uric acid and phenobarbital.

## 2022-02-17 ENCOUNTER — TELEPHONE (OUTPATIENT)
Dept: FAMILY MEDICINE CLINIC | Facility: CLINIC | Age: 67
End: 2022-02-17

## 2022-02-17 RX ORDER — PHENOBARBITAL 60 MG/1
120 TABLET ORAL 2 TIMES DAILY
Qty: 360 TABLET | Refills: 0 | OUTPATIENT
Start: 2022-02-17

## 2022-02-17 NOTE — TELEPHONE ENCOUNTER
Future appt: Your appointments     Date & Time Appointment Department [facility])    Mar 17, 2022  8:45 AM CDT Laboratory Visit with REF [facility] ([facility])        Mar 22, 2022  3:30 PM CDT Physical - Established with Fabio Petersen MD 25 [facility]            [address] [place] [id number]  [address] Ref Lab [place] [id number]  [phone]        Last Appointment with provider: 7/2/21 for annual physical.  Last appointment at [facility]:  Visit date not found  Cholesterol, Total (mg/dL)   Date Value   07/02/2021 178     HDL Cholesterol (mg/dL)   Date Value   07/02/2021 80 (H)     LDL Cholesterol (mg/dL)   Date Value   07/02/2021 84     Triglycerides (mg/dL)   Date Value   07/02/2021 77     No results found for: EAG, A1C  Lab Results   Component Value Date    TSH 1.050 07/02/2021       No follow-ups on file.

## 2022-02-17 NOTE — TELEPHONE ENCOUNTER
Would like a refill on her on her Phenobarbital 60mg. Her next appt w/ Dr. Callie Vazquez is 3/2  sh's running low.   Pharmacy: [place]  Future Appointments   Date Time Provider Godfrey [place]   3/17/2022  8:45 AM REF [place] [facility]   3/22/2022  3:30 PM Brook Mcdaniel MD [facility] [place] [facility] [place]

## 2022-06-30 ENCOUNTER — LABORATORY ENCOUNTER (OUTPATIENT)
Dept: LAB | Age: 67
End: 2022-06-30
Attending: FAMILY MEDICINE
Payer: MEDICARE

## 2022-06-30 DIAGNOSIS — I10 BENIGN ESSENTIAL HYPERTENSION: ICD-10-CM

## 2022-06-30 DIAGNOSIS — G40.909 SEIZURE DISORDER (HCC): ICD-10-CM

## 2022-06-30 DIAGNOSIS — R73.09 ELEVATED GLUCOSE: ICD-10-CM

## 2022-06-30 DIAGNOSIS — Z00.00 ANNUAL PHYSICAL EXAM: ICD-10-CM

## 2022-06-30 LAB
ALBUMIN SERPL-MCNC: 3.8 G/DL (ref 3.4–5)
ALBUMIN/GLOB SERPL: 1.1 {RATIO} (ref 1–2)
ALP LIVER SERPL-CCNC: 143 U/L
ALT SERPL-CCNC: 24 U/L
ANION GAP SERPL CALC-SCNC: 7 MMOL/L (ref 0–18)
AST SERPL-CCNC: 19 U/L (ref 15–37)
BASOPHILS # BLD AUTO: 0.03 X10(3) UL (ref 0–0.2)
BASOPHILS NFR BLD AUTO: 0.6 %
BILIRUB SERPL-MCNC: 0.5 MG/DL (ref 0.1–2)
BILIRUB UR QL STRIP.AUTO: NEGATIVE
BUN BLD-MCNC: 13 MG/DL (ref 7–18)
CALCIUM BLD-MCNC: 8.9 MG/DL (ref 8.5–10.1)
CHLORIDE SERPL-SCNC: 100 MMOL/L (ref 98–112)
CHOLEST SERPL-MCNC: 159 MG/DL (ref ?–200)
CLARITY UR REFRACT.AUTO: CLEAR
CO2 SERPL-SCNC: 27 MMOL/L (ref 21–32)
COLOR UR AUTO: YELLOW
CREAT BLD-MCNC: 0.6 MG/DL
EOSINOPHIL # BLD AUTO: 0.28 X10(3) UL (ref 0–0.7)
EOSINOPHIL NFR BLD AUTO: 5.2 %
ERYTHROCYTE [DISTWIDTH] IN BLOOD BY AUTOMATED COUNT: 13 %
EST. AVERAGE GLUCOSE BLD GHB EST-MCNC: 117 MG/DL (ref 68–126)
FASTING PATIENT LIPID ANSWER: YES
FASTING STATUS PATIENT QL REPORTED: YES
GLOBULIN PLAS-MCNC: 3.6 G/DL (ref 2.8–4.4)
GLUCOSE BLD-MCNC: 88 MG/DL (ref 70–99)
GLUCOSE UR STRIP.AUTO-MCNC: NEGATIVE MG/DL
HBA1C MFR BLD: 5.7 % (ref ?–5.7)
HCT VFR BLD AUTO: 40.4 %
HDLC SERPL-MCNC: 74 MG/DL (ref 40–59)
HGB BLD-MCNC: 13.5 G/DL
IMM GRANULOCYTES # BLD AUTO: 0.02 X10(3) UL (ref 0–1)
IMM GRANULOCYTES NFR BLD: 0.4 %
KETONES UR STRIP.AUTO-MCNC: NEGATIVE MG/DL
LDLC SERPL CALC-MCNC: 72 MG/DL (ref ?–100)
LEUKOCYTE ESTERASE UR QL STRIP.AUTO: NEGATIVE
LYMPHOCYTES # BLD AUTO: 1.1 X10(3) UL (ref 1–4)
LYMPHOCYTES NFR BLD AUTO: 20.6 %
MCH RBC QN AUTO: 30.4 PG (ref 26–34)
MCHC RBC AUTO-ENTMCNC: 33.4 G/DL (ref 31–37)
MCV RBC AUTO: 91 FL
MONOCYTES # BLD AUTO: 0.45 X10(3) UL (ref 0.1–1)
MONOCYTES NFR BLD AUTO: 8.4 %
NEUTROPHILS # BLD AUTO: 3.46 X10 (3) UL (ref 1.5–7.7)
NEUTROPHILS # BLD AUTO: 3.46 X10(3) UL (ref 1.5–7.7)
NEUTROPHILS NFR BLD AUTO: 64.8 %
NITRITE UR QL STRIP.AUTO: NEGATIVE
NONHDLC SERPL-MCNC: 85 MG/DL (ref ?–130)
OSMOLALITY SERPL CALC.SUM OF ELEC: 278 MOSM/KG (ref 275–295)
PH UR STRIP.AUTO: 7 [PH] (ref 5–8)
PLATELET # BLD AUTO: 217 10(3)UL (ref 150–450)
POTASSIUM SERPL-SCNC: 3.5 MMOL/L (ref 3.5–5.1)
PROT SERPL-MCNC: 7.4 G/DL (ref 6.4–8.2)
PROT UR STRIP.AUTO-MCNC: NEGATIVE MG/DL
RBC # BLD AUTO: 4.44 X10(6)UL
RBC UR QL AUTO: NEGATIVE
SODIUM SERPL-SCNC: 134 MMOL/L (ref 136–145)
SP GR UR STRIP.AUTO: 1.01 (ref 1–1.03)
TRIGL SERPL-MCNC: 68 MG/DL (ref 30–149)
TSI SER-ACNC: 1.55 MIU/ML (ref 0.36–3.74)
URATE SERPL-MCNC: 5.9 MG/DL
UROBILINOGEN UR STRIP.AUTO-MCNC: <2 MG/DL
VLDLC SERPL CALC-MCNC: 10 MG/DL (ref 0–30)
WBC # BLD AUTO: 5.3 X10(3) UL (ref 4–11)

## 2022-06-30 PROCEDURE — 36415 COLL VENOUS BLD VENIPUNCTURE: CPT

## 2022-06-30 PROCEDURE — 81003 URINALYSIS AUTO W/O SCOPE: CPT

## 2022-06-30 PROCEDURE — 83036 HEMOGLOBIN GLYCOSYLATED A1C: CPT

## 2022-06-30 PROCEDURE — 84443 ASSAY THYROID STIM HORMONE: CPT

## 2022-06-30 PROCEDURE — 84550 ASSAY OF BLOOD/URIC ACID: CPT

## 2022-06-30 PROCEDURE — 80061 LIPID PANEL: CPT

## 2022-06-30 PROCEDURE — 85025 COMPLETE CBC W/AUTO DIFF WBC: CPT

## 2022-06-30 PROCEDURE — 80053 COMPREHEN METABOLIC PANEL: CPT

## 2022-07-06 ENCOUNTER — LAB ENCOUNTER (OUTPATIENT)
Dept: LAB | Age: 67
End: 2022-07-06
Attending: FAMILY MEDICINE
Payer: MEDICARE

## 2022-07-06 ENCOUNTER — OFFICE VISIT (OUTPATIENT)
Dept: FAMILY MEDICINE CLINIC | Facility: CLINIC | Age: 67
End: 2022-07-06
Payer: MEDICARE

## 2022-07-06 ENCOUNTER — TELEPHONE (OUTPATIENT)
Dept: FAMILY MEDICINE CLINIC | Facility: CLINIC | Age: 67
End: 2022-07-06

## 2022-07-06 VITALS
HEART RATE: 76 BPM | SYSTOLIC BLOOD PRESSURE: 138 MMHG | BODY MASS INDEX: 25.96 KG/M2 | RESPIRATION RATE: 12 BRPM | TEMPERATURE: 97 F | DIASTOLIC BLOOD PRESSURE: 72 MMHG | HEIGHT: 65 IN | WEIGHT: 155.81 LBS

## 2022-07-06 DIAGNOSIS — Z00.00 ENCOUNTER FOR ANNUAL HEALTH EXAMINATION: Primary | ICD-10-CM

## 2022-07-06 DIAGNOSIS — Z13.6 ENCOUNTER FOR ABDOMINAL AORTIC ANEURYSM (AAA) SCREENING: Primary | ICD-10-CM

## 2022-07-06 DIAGNOSIS — I10 BENIGN ESSENTIAL HYPERTENSION: ICD-10-CM

## 2022-07-06 DIAGNOSIS — Z78.0 POST-MENOPAUSAL: ICD-10-CM

## 2022-07-06 DIAGNOSIS — Z13.31 DEPRESSION SCREENING: ICD-10-CM

## 2022-07-06 DIAGNOSIS — M15.9 PRIMARY OSTEOARTHRITIS INVOLVING MULTIPLE JOINTS: ICD-10-CM

## 2022-07-06 DIAGNOSIS — G40.909 SEIZURE DISORDER (HCC): ICD-10-CM

## 2022-07-06 LAB — PHENOBARB SERPL-MCNC: 32.6 UG/ML (ref 15–40)

## 2022-07-06 PROCEDURE — G0444 DEPRESSION SCREEN ANNUAL: HCPCS | Performed by: FAMILY MEDICINE

## 2022-07-06 PROCEDURE — 36415 COLL VENOUS BLD VENIPUNCTURE: CPT

## 2022-07-06 PROCEDURE — G0439 PPPS, SUBSEQ VISIT: HCPCS | Performed by: FAMILY MEDICINE

## 2022-07-06 PROCEDURE — 80184 ASSAY OF PHENOBARBITAL: CPT

## 2022-07-06 PROCEDURE — 1126F AMNT PAIN NOTED NONE PRSNT: CPT | Performed by: FAMILY MEDICINE

## 2022-07-06 NOTE — TELEPHONE ENCOUNTER
----- Message from Tiffany Donovan MD sent at 7/6/2022  5:33 PM CDT -----  Level stable- please notify pt and forward to her neurlogist

## 2022-07-06 NOTE — TELEPHONE ENCOUNTER
Pt informed. Lab report faxed to Dr. Elisha Coffey. Pt also mentioned- looking at her visit screening - that there is a note stating she is eligible to have aortic aneurysm screening. Covered by Medicare once with family history. Pt states her mother had an aortic aneurysm- pt asked if she should have one herself?

## 2022-08-02 ENCOUNTER — TELEPHONE (OUTPATIENT)
Dept: FAMILY MEDICINE CLINIC | Facility: CLINIC | Age: 67
End: 2022-08-02

## 2022-08-02 DIAGNOSIS — M81.0 AGE-RELATED OSTEOPOROSIS WITHOUT CURRENT PATHOLOGICAL FRACTURE: Primary | ICD-10-CM

## 2022-08-02 NOTE — TELEPHONE ENCOUNTER
Pt informed. Lab appt scheduled.     Future Appointments   Date Time Provider Godfrey Davis   8/3/2022 10:30 AM REF [place] [facility]

## 2022-08-02 NOTE — TELEPHONE ENCOUNTER
Bone density scan reviewed. Finding:  OSTEOPOROSIS IN SPINE, OSTEOPENIA IN HIP    PT ADVISED TO CHECK VIT D LEVEL, PARATHYROID. Patient encouraged to engage in weightbearing exercises ideally walking and biking    All patients should ensure an adequate intake of dietary calcium and vitamin D. The NOF recommends adults under age 48 need 1000 mg of calcium and 400-800 international units of vitamin D daily. Patient's over the age of 48 are recommended to have 1200 mg of calcium and 800-1000 international units of vitamin D daily.

## 2022-08-03 ENCOUNTER — PATIENT MESSAGE (OUTPATIENT)
Dept: FAMILY MEDICINE CLINIC | Facility: CLINIC | Age: 67
End: 2022-08-03

## 2022-08-03 ENCOUNTER — TELEPHONE (OUTPATIENT)
Dept: FAMILY MEDICINE CLINIC | Facility: CLINIC | Age: 67
End: 2022-08-03

## 2022-08-03 ENCOUNTER — LABORATORY ENCOUNTER (OUTPATIENT)
Dept: LAB | Age: 67
End: 2022-08-03
Attending: FAMILY MEDICINE
Payer: MEDICARE

## 2022-08-03 DIAGNOSIS — E21.3 HYPERPARATHYROIDISM (HCC): ICD-10-CM

## 2022-08-03 DIAGNOSIS — M81.0 AGE-RELATED OSTEOPOROSIS WITHOUT CURRENT PATHOLOGICAL FRACTURE: ICD-10-CM

## 2022-08-03 DIAGNOSIS — E55.9 VITAMIN D DEFICIENCY: ICD-10-CM

## 2022-08-03 DIAGNOSIS — M81.0 AGE-RELATED OSTEOPOROSIS WITHOUT CURRENT PATHOLOGICAL FRACTURE: Primary | ICD-10-CM

## 2022-08-03 LAB
PTH-INTACT SERPL-MCNC: 89.9 PG/ML (ref 18.5–88)
VIT D+METAB SERPL-MCNC: 26.8 NG/ML (ref 30–100)

## 2022-08-03 PROCEDURE — 83970 ASSAY OF PARATHORMONE: CPT

## 2022-08-03 PROCEDURE — 82306 VITAMIN D 25 HYDROXY: CPT

## 2022-08-03 PROCEDURE — 36415 COLL VENOUS BLD VENIPUNCTURE: CPT

## 2022-08-03 NOTE — TELEPHONE ENCOUNTER
From: Flor SNELL  To: Barbara Bhakta  Sent: 8/3/2022 12:44 PM CDT  Subject: Question re: supplements Lethea Pink, Dr. Milford Lundborg states- \"will await lab results for further recommendations. \"    We will call when results are available. Thank you.   Flor SENA

## 2022-08-03 NOTE — TELEPHONE ENCOUNTER
Pt states that she has some questions from yesterday's conversation. States that it's not an emergency.

## 2022-08-03 NOTE — TELEPHONE ENCOUNTER
Pt contacted- pt was able to see the test result via my chart through [email or web address]. Pt had Bone Density completed at [facility]- see phone note dated 8/2/22    Pt had labs drawn today- pending. See pt question below re: supplements. Please advise.

## 2022-08-03 NOTE — TELEPHONE ENCOUNTER
From: Maged Mcnulty  To: Hussein Jean MD  Sent: 8/3/2022 8:32 AM CDT  Subject: DEXA scan    I haven't received the result info in [employer] and would like to see that. Do you know when it might show on "[employer]hart? Regarding vitamins:  Multivitamin:    1000 IU of D3    300 mg of Ca    Ca, Mg, Zn supplement (3x per day):   200 IU of D3 (x3)   333 mg of Ca (x3)    This would be a total of 1200 IU of D3 and 1299 mg of Ca. Is this OK? Thanks for everything! I really appreciate it.   Sandre Meter

## 2022-08-03 NOTE — TELEPHONE ENCOUNTER
----- Message from Pema Berrios MD sent at 8/3/2022  5:37 PM CDT -----  PTH level is just slightly elevated and vitamin D level is low. I would recommend vitamin D 5000 units daily. I would recommend endocrinology consultation to further evaluate her osteoporosis. Abnormalality of pth- mild, may be related to medication    Patient also looking to have neurology consultation to review her seizure treatment. Okay for both referrals. Results forwarded to patient via [email or web address] system. Patient encouraged to call for any questions or concerns.

## 2022-08-03 NOTE — TELEPHONE ENCOUNTER
Pt informed. Pt verbalized understanding. Referral with lab report and dexa scan faxed to Dr. Vera Alcantara.

## 2022-08-12 ENCOUNTER — TELEPHONE (OUTPATIENT)
Dept: FAMILY MEDICINE CLINIC | Facility: CLINIC | Age: 67
End: 2022-08-12

## 2022-08-12 RX ORDER — HYDROCHLOROTHIAZIDE 12.5 MG/1
12.5 CAPSULE, GELATIN COATED ORAL DAILY
Qty: 90 CAPSULE | Refills: 3 | Status: SHIPPED | OUTPATIENT
Start: 2022-08-12

## 2022-08-12 NOTE — TELEPHONE ENCOUNTER
Future appt:    Last Appointment with provider:   7/6/2022-pX - pt advised to return in one year  Last appointment at [facility]:  7/6/2022    Pharmacy called -requested refill of hydrochlorothiazide for pt. Last refilled:  8/2/21 for one year    Cholesterol, Total (mg/dL)   Date Value   06/30/2022 159     HDL Cholesterol (mg/dL)   Date Value   06/30/2022 74 (H)     LDL Cholesterol (mg/dL)   Date Value   06/30/2022 72     Triglycerides (mg/dL)   Date Value   06/30/2022 68     Lab Results   Component Value Date     06/30/2022    A1C 5.7 (H) 06/30/2022     Lab Results   Component Value Date    TSH 1.550 06/30/2022       No follow-ups on file.

## 2022-10-05 ENCOUNTER — PATIENT MESSAGE (OUTPATIENT)
Dept: FAMILY MEDICINE CLINIC | Facility: CLINIC | Age: 67
End: 2022-10-05

## 2022-10-05 DIAGNOSIS — M81.0 AGE-RELATED OSTEOPOROSIS WITHOUT CURRENT PATHOLOGICAL FRACTURE: Primary | ICD-10-CM

## 2022-10-05 DIAGNOSIS — E55.9 VITAMIN D DEFICIENCY: ICD-10-CM

## 2022-10-05 DIAGNOSIS — R73.09 ELEVATED GLUCOSE: ICD-10-CM

## 2022-10-05 NOTE — TELEPHONE ENCOUNTER
Pt informed. Appt scheduled.     Future Appointments   Date Time Provider Godfrey Davis   11/4/2022  8:15 AM RE[facility]

## 2022-10-05 NOTE — TELEPHONE ENCOUNTER
Pt had px 7/6/22  Pt was referred  to Dr. Vera Alcantara on 8/3/22 due to osteoporosis, Vitamin D def, and hyperparathyroidism. Pt had labs on 6/30/22 and 8/3/22. Pt asked if she should recheck labs again prior to her appt with Dr. Vera Alcantara on 12/1/22. Pt states she has made diet changes and has increased her supplements. Pt feels she should have more recent labs drawn in November before she is seen in December.

## 2022-10-05 NOTE — TELEPHONE ENCOUNTER
Pt referred for osteoporsis  She can have labs after 11/3/22 ( 3 months)  - chem, pth and vit D ordered    Unrelated is checking HGBa1c for elevated glucose-  Will add if she is getting labs drawn

## 2022-10-05 NOTE — TELEPHONE ENCOUNTER
From: Maged Mcnulty  To: Hussein Jean MD  Sent: 10/5/2022 9:43 AM CDT  Subject: Visit to Dr. Mcclendon Grad morning! I have an appointment with Dr. Justus Estrada on December 1. Would you be able to order an appropriate blood test for me (and fax to Dr. Jerome Nurse) so he can see more up to date results prior to this appointment? I've lost some weight, I've been working out, and taking vitamins that you've recommended. ..just thinking numbers might have improved by December 1.

## 2022-12-21 ENCOUNTER — TELEPHONE (OUTPATIENT)
Dept: FAMILY MEDICINE CLINIC | Facility: CLINIC | Age: 67
End: 2022-12-21

## 2022-12-21 NOTE — TELEPHONE ENCOUNTER
Received request to send last 5 years of medical records to [employer], [address] [facility], [address], [address]  Sent to [employer]

## 2022-12-29 ENCOUNTER — PATIENT MESSAGE (OUTPATIENT)
Dept: FAMILY MEDICINE CLINIC | Facility: CLINIC | Age: 67
End: 2022-12-29

## 2022-12-29 DIAGNOSIS — G40.909 SEIZURE DISORDER (HCC): ICD-10-CM

## 2022-12-29 DIAGNOSIS — E87.1 HYPONATREMIA: ICD-10-CM

## 2022-12-29 DIAGNOSIS — Z00.00 ENCOUNTER FOR ANNUAL HEALTH EXAMINATION: ICD-10-CM

## 2022-12-29 DIAGNOSIS — E55.9 VITAMIN D DEFICIENCY: Primary | ICD-10-CM

## 2022-12-29 DIAGNOSIS — R73.09 ELEVATED GLUCOSE: ICD-10-CM

## 2022-12-29 NOTE — TELEPHONE ENCOUNTER
From: Lachelle Staff  To: Asif Shrestha MD  Sent: 12/29/2022 10:47 AM CST  Subject: Blood test before July 7 annual exam    Good morning! I'd like to have my blood work and urine test done before I see Dr. Callie Gold for my annual physical on July 7, if possible. I'm not sure what Dr. Callie Gold will want done, nor am I sure how to make the lab appointment. Any help will be appreciated! Thank you.

## 2022-12-29 NOTE — TELEPHONE ENCOUNTER
Please advise on lab orders to have done prior to annual physical, if appropriate.     Future Appointments   Date Time Provider Godfrey Davis   7/7/2023  8:00 AM Alexandro Boyce MD [facility]

## 2022-12-30 LAB
ALBUMIN: 4.2 G/DL
ALKALINE PHOSPHATASE: 129
ALT: 22
ANION GAP: 6
AST: 20
BILIRUBIN, TOTAL: 0.5 MG/DL
BUN: 15
CALCIUM, IONIZED: 4.8
CALCIUM: 9.1
CARBON DIOXIDE: 33
CHLORIDE: 96
CREATININE: 0.6 MG/DL (ref 0.6–1.2)
GFR NON-AFRICAN AMERICAN: >90
GLUCOSE: 101
MAGNESIUM: 2.2
PHOSPHORUS: 4
POTASSIUM: 4.3
PTH, INTACT: 65.6 PG/ML
SODIUM: 135
TOTAL PROTEIN: 7.1
VITAMIN D, 25-HYDROXY: 50.2 NG/ML

## 2022-12-30 NOTE — TELEPHONE ENCOUNTER
Labs reviewed, we will discuss your lab in detail at the upcoming appointment. This copy is provided for your records. Pt informed. Pt is confused re: what she should do now. pt had some labs completed with DR. Meza on 12/1/22-   Pt had- Vitamin D , PTH, intact, Phosphorus, Magnesium, CMP, and Ionized Calcium. ENtered for review. Should pt plan to have A1C checked with appt in Jan, or should pt wait for appt's until July?

## 2022-12-30 NOTE — TELEPHONE ENCOUNTER
It appears it only lab that was not completed was the A1c to further evaluate patient's glucose status and prediabetic diagnosis. Patient may choose to have the test done at this time. Laboratory orders for full physical in July were entered already.

## 2022-12-30 NOTE — TELEPHONE ENCOUNTER
Pt would like to hold off until July to recheck A1C.     Lab appt scheduled     Future Appointments   Date Time Provider Godfrey Davis   6/26/2023  8:00 AM RE[facility]   7/7/2023  8:00 AM Trish Eduardo MD [facility]

## 2023-01-14 ENCOUNTER — TELEPHONE (OUTPATIENT)
Dept: FAMILY MEDICINE CLINIC | Facility: CLINIC | Age: 68
End: 2023-01-14

## 2023-06-26 ENCOUNTER — LABORATORY ENCOUNTER (OUTPATIENT)
Dept: LAB | Age: 68
End: 2023-06-26
Attending: FAMILY MEDICINE
Payer: MEDICARE

## 2023-06-26 DIAGNOSIS — R73.09 ELEVATED GLUCOSE: ICD-10-CM

## 2023-06-26 DIAGNOSIS — G40.909 SEIZURE DISORDER (HCC): ICD-10-CM

## 2023-06-26 DIAGNOSIS — E87.1 HYPONATREMIA: ICD-10-CM

## 2023-06-26 DIAGNOSIS — Z00.00 ENCOUNTER FOR ANNUAL HEALTH EXAMINATION: ICD-10-CM

## 2023-06-26 DIAGNOSIS — E55.9 VITAMIN D DEFICIENCY: ICD-10-CM

## 2023-06-26 LAB
ALBUMIN SERPL-MCNC: 3.8 G/DL (ref 3.4–5)
ALBUMIN/GLOB SERPL: 1.2 {RATIO} (ref 1–2)
ALP LIVER SERPL-CCNC: 114 U/L
ALT SERPL-CCNC: 33 U/L
ANION GAP SERPL CALC-SCNC: 6 MMOL/L (ref 0–18)
AST SERPL-CCNC: 18 U/L (ref 15–37)
BASOPHILS # BLD AUTO: 0.03 X10(3) UL (ref 0–0.2)
BASOPHILS NFR BLD AUTO: 0.6 %
BILIRUB SERPL-MCNC: 0.6 MG/DL (ref 0.1–2)
BILIRUB UR QL STRIP.AUTO: NEGATIVE
BUN BLD-MCNC: 13 MG/DL (ref 7–18)
CALCIUM BLD-MCNC: 8.9 MG/DL (ref 8.5–10.1)
CHLORIDE SERPL-SCNC: 102 MMOL/L (ref 98–112)
CHOLEST SERPL-MCNC: 161 MG/DL (ref ?–200)
CLARITY UR REFRACT.AUTO: CLEAR
CO2 SERPL-SCNC: 28 MMOL/L (ref 21–32)
COLOR UR AUTO: YELLOW
CREAT BLD-MCNC: 0.61 MG/DL
EOSINOPHIL # BLD AUTO: 0.33 X10(3) UL (ref 0–0.7)
EOSINOPHIL NFR BLD AUTO: 6.1 %
ERYTHROCYTE [DISTWIDTH] IN BLOOD BY AUTOMATED COUNT: 13.2 %
FASTING PATIENT LIPID ANSWER: YES
FASTING STATUS PATIENT QL REPORTED: YES
GFR SERPLBLD BASED ON 1.73 SQ M-ARVRAT: 97 ML/MIN/1.73M2 (ref 60–?)
GLOBULIN PLAS-MCNC: 3.3 G/DL (ref 2.8–4.4)
GLUCOSE BLD-MCNC: 92 MG/DL (ref 70–99)
GLUCOSE UR STRIP.AUTO-MCNC: NEGATIVE MG/DL
HCT VFR BLD AUTO: 41.3 %
HDLC SERPL-MCNC: 90 MG/DL (ref 40–59)
HGB BLD-MCNC: 13.7 G/DL
IMM GRANULOCYTES # BLD AUTO: 0.01 X10(3) UL (ref 0–1)
IMM GRANULOCYTES NFR BLD: 0.2 %
KETONES UR STRIP.AUTO-MCNC: NEGATIVE MG/DL
LDLC SERPL CALC-MCNC: 57 MG/DL (ref ?–100)
LEUKOCYTE ESTERASE UR QL STRIP.AUTO: NEGATIVE
LYMPHOCYTES # BLD AUTO: 1.18 X10(3) UL (ref 1–4)
LYMPHOCYTES NFR BLD AUTO: 21.8 %
MAGNESIUM SERPL-MCNC: 2.2 MG/DL (ref 1.6–2.6)
MCH RBC QN AUTO: 30.6 PG (ref 26–34)
MCHC RBC AUTO-ENTMCNC: 33.2 G/DL (ref 31–37)
MCV RBC AUTO: 92.2 FL
MONOCYTES # BLD AUTO: 0.44 X10(3) UL (ref 0.1–1)
MONOCYTES NFR BLD AUTO: 8.1 %
NEUTROPHILS # BLD AUTO: 3.42 X10 (3) UL (ref 1.5–7.7)
NEUTROPHILS # BLD AUTO: 3.42 X10(3) UL (ref 1.5–7.7)
NEUTROPHILS NFR BLD AUTO: 63.2 %
NITRITE UR QL STRIP.AUTO: NEGATIVE
NONHDLC SERPL-MCNC: 71 MG/DL (ref ?–130)
OSMOLALITY SERPL CALC.SUM OF ELEC: 282 MOSM/KG (ref 275–295)
PH UR STRIP.AUTO: 8 [PH] (ref 5–8)
PHENOBARB SERPL-MCNC: 20 UG/ML (ref 15–40)
PLATELET # BLD AUTO: 212 10(3)UL (ref 150–450)
POTASSIUM SERPL-SCNC: 3.7 MMOL/L (ref 3.5–5.1)
PROT SERPL-MCNC: 7.1 G/DL (ref 6.4–8.2)
PROT UR STRIP.AUTO-MCNC: NEGATIVE MG/DL
PTH-INTACT SERPL-MCNC: 42 PG/ML (ref 18.5–88)
RBC # BLD AUTO: 4.48 X10(6)UL
SODIUM SERPL-SCNC: 136 MMOL/L (ref 136–145)
SP GR UR STRIP.AUTO: 1.01 (ref 1–1.03)
TRIGL SERPL-MCNC: 73 MG/DL (ref 30–149)
TSI SER-ACNC: 1.29 MIU/ML (ref 0.36–3.74)
UROBILINOGEN UR STRIP.AUTO-MCNC: <2 MG/DL
VIT D+METAB SERPL-MCNC: 54.8 NG/ML (ref 30–100)
VLDLC SERPL CALC-MCNC: 11 MG/DL (ref 0–30)
WBC # BLD AUTO: 5.4 X10(3) UL (ref 4–11)

## 2023-06-26 PROCEDURE — 81001 URINALYSIS AUTO W/SCOPE: CPT

## 2023-06-26 PROCEDURE — 83036 HEMOGLOBIN GLYCOSYLATED A1C: CPT

## 2023-06-26 PROCEDURE — 80184 ASSAY OF PHENOBARBITAL: CPT

## 2023-06-26 PROCEDURE — 82306 VITAMIN D 25 HYDROXY: CPT

## 2023-06-26 PROCEDURE — 83735 ASSAY OF MAGNESIUM: CPT

## 2023-06-26 PROCEDURE — 85025 COMPLETE CBC W/AUTO DIFF WBC: CPT

## 2023-06-26 PROCEDURE — 80061 LIPID PANEL: CPT

## 2023-06-26 PROCEDURE — 80053 COMPREHEN METABOLIC PANEL: CPT

## 2023-06-26 PROCEDURE — 84443 ASSAY THYROID STIM HORMONE: CPT

## 2023-06-26 PROCEDURE — 83970 ASSAY OF PARATHORMONE: CPT

## 2023-06-26 PROCEDURE — 36415 COLL VENOUS BLD VENIPUNCTURE: CPT

## 2023-06-28 LAB
EST. AVERAGE GLUCOSE BLD GHB EST-MCNC: 111 MG/DL (ref 68–126)
HBA1C MFR BLD: 5.5 % (ref ?–5.7)

## 2023-07-07 ENCOUNTER — OFFICE VISIT (OUTPATIENT)
Dept: FAMILY MEDICINE CLINIC | Facility: CLINIC | Age: 68
End: 2023-07-07
Payer: MEDICARE

## 2023-07-07 VITALS
WEIGHT: 148 LBS | HEIGHT: 65 IN | RESPIRATION RATE: 16 BRPM | TEMPERATURE: 98 F | DIASTOLIC BLOOD PRESSURE: 86 MMHG | HEART RATE: 80 BPM | SYSTOLIC BLOOD PRESSURE: 122 MMHG | OXYGEN SATURATION: 97 % | BODY MASS INDEX: 24.66 KG/M2

## 2023-07-07 DIAGNOSIS — G40.909 SEIZURE DISORDER (HCC): ICD-10-CM

## 2023-07-07 DIAGNOSIS — Z00.00 ENCOUNTER FOR ANNUAL HEALTH EXAMINATION: Primary | ICD-10-CM

## 2023-07-07 DIAGNOSIS — I10 BENIGN ESSENTIAL HYPERTENSION: ICD-10-CM

## 2023-07-07 DIAGNOSIS — M15.9 PRIMARY OSTEOARTHRITIS INVOLVING MULTIPLE JOINTS: ICD-10-CM

## 2023-07-07 PROCEDURE — 1126F AMNT PAIN NOTED NONE PRSNT: CPT | Performed by: FAMILY MEDICINE

## 2023-07-07 PROCEDURE — G0439 PPPS, SUBSEQ VISIT: HCPCS | Performed by: FAMILY MEDICINE

## 2023-07-07 RX ORDER — CAL/D3/MAG11/ZINC/COP/MANG/BOR 600 MG-800
1 TABLET ORAL 2 TIMES DAILY
COMMUNITY

## 2023-07-07 RX ORDER — CHOLECALCIFEROL (VITAMIN D3) 50 MCG
1 TABLET ORAL DAILY
COMMUNITY

## 2023-07-07 RX ORDER — MULTIVITAMIN
1 TABLET ORAL DAILY
COMMUNITY

## 2023-07-07 RX ORDER — LEVETIRACETAM 1000 MG/1
1000 TABLET, FILM COATED ORAL 2 TIMES DAILY
COMMUNITY
Start: 2023-02-19

## 2023-07-07 RX ORDER — PHYTONADIONE (VIT K1) 100 MCG
1 TABLET ORAL DAILY
COMMUNITY

## 2023-07-07 RX ORDER — HYDROCHLOROTHIAZIDE 12.5 MG/1
12.5 CAPSULE, GELATIN COATED ORAL DAILY
Qty: 90 CAPSULE | Refills: 3 | Status: SHIPPED | OUTPATIENT
Start: 2023-07-07

## 2023-07-07 RX ORDER — MAGNESIUM 200 MG
1 TABLET ORAL DAILY
COMMUNITY

## 2023-07-07 NOTE — PATIENT INSTRUCTIONS
Rec continue present care  Continue exercise  Recheck yearly and as needed      Zeeshan Varela Venemarian's SCREENING SCHEDULE   Tests on this list are recommended by your physician but may not be covered, or covered at this frequency, by your insurer. Please check with your insurance carrier before scheduling to verify coverage. PREVENTATIVE SERVICES FREQUENCY &  COVERAGE DETAILS LAST COMPLETION DATE   Diabetes Screening    Fasting Blood Sugar /  Glucose    One screening every 12 months if never tested or if previously tested but not diagnosed with pre-diabetes   One screening every 6 months if diagnosed with pre-diabetes Lab Results   Component Value Date    GLU 92 06/26/2023        Cardiovascular Disease Screening    Lipid Panel  Cholesterol  Lipoprotein (HDL)  Triglycerides Covered every 5 years for all Medicare beneficiaries without apparent signs or symptoms of cardiovascular disease Lab Results   Component Value Date    CHOLEST 161 06/26/2023    HDL 90 (H) 06/26/2023    LDL 57 06/26/2023    TRIG 73 06/26/2023         Electrocardiogram (EKG)   Covered if needed at Welcome to Medicare, and non-screening if indicated for medical reasons 07/06/2020      Ultrasound Screening for Abdominal Aortic Aneurysm (AAA) Covered once in a lifetime for one of the following risk factors    Men who are 73-68 years old and have ever smoked    Anyone with a family history -     Colorectal Cancer Screening  Covered for ages 52-80; only need ONE of the following:    Colonoscopy   Covered every 10 years    Covered every 2 years if patient is at high risk or previous colonoscopy was abnormal 12/03/2018    Colorectal Cancer Screening due on 12/03/2028    Flexible Sigmoidoscopy   Covered every 4 years -    Fecal Occult Blood Test Covered annually -   Bone Density Screening    Bone density screening    Covered every 2 years after age 72 if diagnosed with risk of osteoporosis or estrogen deficiency.     Covered yearly for long-term glucocorticoid medication use (Steroids) No results found for this or any previous visit. No recommendations at this time   Pap and Pelvic    Pap   Covered every 2 years for women at normal risk;  Annually if at high risk -  No recommendations at this time    Chlamydia Annually if high risk -  No recommendations at this time   Screening Mammogram    Mammogram     Recommend annually for all female patients aged 36 and older    One baseline mammogram covered for patients aged 32-38 11/22/2022    Mammogram due on 11/22/2023    Immunizations    Influenza Covered once per flu season  Please get every year -  No recommendations at this time    Pneumococcal Each vaccine (Ciblqmn07 & Akufvtnks05) covered once after 72 Prevnar 13: 06/30/2020    Tvgaetaxn19: 07/02/2021     No recommendations at this time    Hepatitis B One screening covered for patients with certain risk factors   -  No recommendations at this time    Tetanus Toxoid Not covered by Medicare Part B unless medically necessary (cut with metal); may be covered with your pharmacy prescription benefits -    Tetanus, Diptheria and Pertusis TD and TDaP Not covered by Medicare Part B -  No recommendations at this time    Zoster Not covered by Medicare Part B; may be covered with your pharmacy  prescription benefits -  No recommendations at this time     Annual Monitoring of Persistent Medications (ACE/ARB, digoxin diuretics, anticonvulsants)    Potassium Annually Lab Results   Component Value Date    K 3.7 06/26/2023         Creatinine   Annually Lab Results   Component Value Date    CREATSERUM 0.61 06/26/2023         BUN Annually Lab Results   Component Value Date    BUN 13 06/26/2023       Drug Serum Conc Annually No results found for: DIGOXIN, DIG, VALP

## 2023-08-25 ENCOUNTER — TELEPHONE (OUTPATIENT)
Dept: FAMILY MEDICINE CLINIC | Facility: CLINIC | Age: 68
End: 2023-08-25

## 2023-08-25 NOTE — TELEPHONE ENCOUNTER
Pt states she has a bunion on her right foot that is really starting to give her problems, especially when wearing shoes,. Pt is requesting order to podiatrist.    Pt informed appt is needed for any referral orders. Pt scheduled appt with NP on 8/28.     Future Appointments   Date Time Provider Godfrey Davis   8/28/2023  7:00 AM CL De[facility]

## 2023-08-28 ENCOUNTER — OFFICE VISIT (OUTPATIENT)
Dept: FAMILY MEDICINE CLINIC | Facility: CLINIC | Age: 68
End: 2023-08-28
Payer: MEDICARE

## 2023-08-28 VITALS
DIASTOLIC BLOOD PRESSURE: 76 MMHG | SYSTOLIC BLOOD PRESSURE: 150 MMHG | BODY MASS INDEX: 25.16 KG/M2 | RESPIRATION RATE: 16 BRPM | TEMPERATURE: 98 F | HEART RATE: 76 BPM | HEIGHT: 65 IN | WEIGHT: 151 LBS

## 2023-08-28 DIAGNOSIS — M21.611 BUNION, RIGHT FOOT: Primary | ICD-10-CM

## 2023-08-28 PROBLEM — E21.3 HYPERPARATHYROIDISM (HCC): Status: ACTIVE | Noted: 2023-08-28

## 2023-08-28 RX ORDER — PHENOBARBITAL 30 MG/1
30 TABLET ORAL 2 TIMES DAILY
COMMUNITY
Start: 2023-08-09

## 2023-08-28 RX ORDER — CEPHALEXIN 500 MG/1
500 CAPSULE ORAL 3 TIMES DAILY
Qty: 21 CAPSULE | Refills: 0 | Status: SHIPPED | OUTPATIENT
Start: 2023-08-28 | End: 2023-09-04

## 2023-08-28 NOTE — PATIENT INSTRUCTIONS
Podiatry consult with Dr. Sepulveda Plan. Please make an appointment    Wear shoes with wide toe boxes    Take ibuprofen for pain    Keflex is your antibiotic.

## 2023-08-29 ENCOUNTER — PATIENT MESSAGE (OUTPATIENT)
Dept: FAMILY MEDICINE CLINIC | Facility: CLINIC | Age: 68
End: 2023-08-29

## 2023-09-20 ENCOUNTER — OFFICE VISIT (OUTPATIENT)
Dept: FAMILY MEDICINE CLINIC | Facility: CLINIC | Age: 68
End: 2023-09-20
Payer: MEDICARE

## 2023-09-20 VITALS
HEART RATE: 80 BPM | BODY MASS INDEX: 24.99 KG/M2 | HEIGHT: 65 IN | SYSTOLIC BLOOD PRESSURE: 128 MMHG | OXYGEN SATURATION: 99 % | TEMPERATURE: 98 F | DIASTOLIC BLOOD PRESSURE: 72 MMHG | RESPIRATION RATE: 18 BRPM | WEIGHT: 150 LBS

## 2023-09-20 DIAGNOSIS — N30.01 ACUTE CYSTITIS WITH HEMATURIA: Primary | ICD-10-CM

## 2023-09-20 DIAGNOSIS — R30.0 BURNING WITH URINATION: ICD-10-CM

## 2023-09-20 DIAGNOSIS — E21.3 HYPERPARATHYROIDISM (HCC): ICD-10-CM

## 2023-09-20 LAB
APPEARANCE: CLEAR
BILIRUB UR QL STRIP.AUTO: NEGATIVE
BILIRUBIN: NEGATIVE
CLARITY UR REFRACT.AUTO: CLEAR
COLOR UR AUTO: YELLOW
GLUCOSE (URINE DIPSTICK): NEGATIVE MG/DL
GLUCOSE UR STRIP.AUTO-MCNC: NEGATIVE MG/DL
KETONES (URINE DIPSTICK): NEGATIVE MG/DL
KETONES UR STRIP.AUTO-MCNC: NEGATIVE MG/DL
MULTISTIX LOT#: ABNORMAL NUMERIC
NITRITE UR QL STRIP.AUTO: NEGATIVE
NITRITE, URINE: NEGATIVE
PH UR STRIP.AUTO: 7 [PH] (ref 5–8)
PH, URINE: 7 (ref 4.5–8)
PROT UR STRIP.AUTO-MCNC: NEGATIVE MG/DL
PROTEIN (URINE DIPSTICK): NEGATIVE MG/DL
SP GR UR STRIP.AUTO: 1.01 (ref 1–1.03)
SPECIFIC GRAVITY: 1.01 (ref 1–1.03)
URINE-COLOR: YELLOW
UROBILINOGEN UR STRIP.AUTO-MCNC: 0.2 MG/DL
UROBILINOGEN,SEMI-QN: 0.2 MG/DL (ref 0–1.9)

## 2023-09-20 PROCEDURE — 87077 CULTURE AEROBIC IDENTIFY: CPT | Performed by: NURSE PRACTITIONER

## 2023-09-20 PROCEDURE — 99214 OFFICE O/P EST MOD 30 MIN: CPT | Performed by: NURSE PRACTITIONER

## 2023-09-20 PROCEDURE — 87186 SC STD MICRODIL/AGAR DIL: CPT | Performed by: NURSE PRACTITIONER

## 2023-09-20 PROCEDURE — 81001 URINALYSIS AUTO W/SCOPE: CPT | Performed by: NURSE PRACTITIONER

## 2023-09-20 PROCEDURE — 87086 URINE CULTURE/COLONY COUNT: CPT | Performed by: NURSE PRACTITIONER

## 2023-09-20 PROCEDURE — 81003 URINALYSIS AUTO W/O SCOPE: CPT | Performed by: NURSE PRACTITIONER

## 2023-09-20 RX ORDER — NITROFURANTOIN 25; 75 MG/1; MG/1
100 CAPSULE ORAL 2 TIMES DAILY
Qty: 20 CAPSULE | Refills: 0 | Status: SHIPPED | OUTPATIENT
Start: 2023-09-20 | End: 2023-09-30

## 2023-09-20 NOTE — PATIENT INSTRUCTIONS
Urinary tract infection cause and prevention discussed. Drink more water, don't hold urine, urinate after intercourse, don't take bubble baths or use scented soaps, don't use powders, keep bowels regular. Follow up if symptoms persist or if you experience flank pain, vomiting, or fever. To help constipation, it is important to eat a healthy diet, increase non- constipation beverages, increase fiber, fruits and veggies, etc. and increase exercise. You may also take Miralax 17 g in 8 oz of water once a day until you have a soft bowel movement. You can take it less often to maintain soft stools.

## (undated) DIAGNOSIS — G40.909 SEIZURE DISORDER (HCC): Primary | ICD-10-CM

## (undated) DIAGNOSIS — Z00.00 ANNUAL PHYSICAL EXAM: ICD-10-CM

## (undated) DIAGNOSIS — G40.909 SEIZURE DISORDER (HCC): ICD-10-CM

## (undated) DIAGNOSIS — I10 BENIGN ESSENTIAL HYPERTENSION: Primary | ICD-10-CM

## (undated) DIAGNOSIS — R73.09 ELEVATED GLUCOSE: ICD-10-CM

## (undated) NOTE — LETTER
09/01/21        Amber Mayorga  [address] [facility]      Dear Jon Robison,    [address] [facility] records indicate that you have outstanding lab work and or testing that was ordered for you and has not yet been completed:  Orders Placed This Encounter

## (undated) NOTE — LETTER
08/02/21        [facility]  [address] [facility]      Dear Arash Fan,    [address] [facility] records indicate that you have outstanding lab work and or testing that was ordered for you and has not yet been completed:  Orders Placed This Encounter